# Patient Record
Sex: MALE | Race: WHITE | NOT HISPANIC OR LATINO | Employment: UNEMPLOYED | URBAN - METROPOLITAN AREA
[De-identification: names, ages, dates, MRNs, and addresses within clinical notes are randomized per-mention and may not be internally consistent; named-entity substitution may affect disease eponyms.]

---

## 2018-06-08 ENCOUNTER — OFFICE VISIT (OUTPATIENT)
Dept: FAMILY MEDICINE CLINIC | Facility: CLINIC | Age: 28
End: 2018-06-08

## 2018-06-08 VITALS
HEART RATE: 92 BPM | TEMPERATURE: 98.6 F | HEIGHT: 69 IN | DIASTOLIC BLOOD PRESSURE: 80 MMHG | WEIGHT: 139 LBS | BODY MASS INDEX: 20.59 KG/M2 | RESPIRATION RATE: 16 BRPM | SYSTOLIC BLOOD PRESSURE: 110 MMHG

## 2018-06-08 DIAGNOSIS — F41.1 GENERALIZED ANXIETY DISORDER: Primary | ICD-10-CM

## 2018-06-08 PROCEDURE — 99213 OFFICE O/P EST LOW 20 MIN: CPT | Performed by: FAMILY MEDICINE

## 2018-06-08 RX ORDER — ESCITALOPRAM OXALATE 10 MG/1
10 TABLET ORAL DAILY
Qty: 30 TABLET | Refills: 2 | Status: SHIPPED | OUTPATIENT
Start: 2018-06-08 | End: 2018-10-05

## 2018-06-08 NOTE — PROGRESS NOTES
Subjective   Hebert Pedraza is a 27 y.o. male.     History of Present Illness     His mood has been an issue for a long time  He has had issues with drugs and has had issues with relationships  Going through rehab for meth addiction at this time  He has had infrequent use of meth recently. Last usage was couple days ago  Got out of alf one month ago and had been there for 3 months    His anxiety is a major issue  He has not done counseling since he was younger  Without meth he is more depressed and down    In 2015 he tried lexapro but did not stay on it very long    The following portions of the patient's history were reviewed and updated as appropriate: allergies, current medications, past family history, past medical history, past social history, past surgical history and problem list.    Review of Systems   Constitutional: Negative.    Psychiatric/Behavioral: Positive for dysphoric mood. The patient is nervous/anxious.        Objective   Physical Exam   Constitutional: He appears well-developed and well-nourished. No distress.   Cardiovascular: Normal rate, regular rhythm and normal heart sounds.    Pulmonary/Chest: Effort normal and breath sounds normal.   Psychiatric: He has a normal mood and affect. His behavior is normal.   Nursing note and vitals reviewed.      Assessment/Plan   Hebert was seen today for anxiety.    Diagnoses and all orders for this visit:    Generalized anxiety disorder  -     escitalopram (LEXAPRO) 10 MG tablet; Take 1 tablet by mouth Daily.    discussed with pt the we can give medicine, he can do counseling but he also needs to follow up for success to be achieved.  Names for counselors given for pt.  Will start lexapro and recheck in one month.  counseling strongly encouraged  Meth use is an issue.  He is trying to wean off but uses every 2-3 days.

## 2018-06-12 ENCOUNTER — TELEPHONE (OUTPATIENT)
Dept: FAMILY MEDICINE CLINIC | Facility: CLINIC | Age: 28
End: 2018-06-12

## 2018-06-12 RX ORDER — HYDROXYZINE PAMOATE 25 MG/1
25 CAPSULE ORAL 3 TIMES DAILY PRN
Qty: 30 CAPSULE | Refills: 1 | Status: SHIPPED | OUTPATIENT
Start: 2018-06-12 | End: 2018-08-13 | Stop reason: SDUPTHER

## 2018-06-12 NOTE — TELEPHONE ENCOUNTER
----- Message from Yuri Iverson sent at 6/12/2018 10:21 AM EDT -----  Contact: PT   PT CALLED TO ASK THAT DR MANZO PLEASE CALL IN A RX FOR VISTARIL THE LOWEST DOSAGE. STATES THAT THE LEXAPRO IS AFFECTING HIS ANXIETY AND MAKES HIS HEART POUND THROUGH CHEST     USES [facility] IN [place]; CALL IF YOU CAN DO THAT

## 2018-08-13 RX ORDER — HYDROXYZINE PAMOATE 25 MG/1
CAPSULE ORAL
Qty: 30 CAPSULE | Refills: 1 | Status: SHIPPED | OUTPATIENT
Start: 2018-08-13 | End: 2018-10-05

## 2018-10-05 ENCOUNTER — OFFICE VISIT (OUTPATIENT)
Dept: FAMILY MEDICINE CLINIC | Facility: CLINIC | Age: 28
End: 2018-10-05

## 2018-10-05 VITALS
WEIGHT: 146 LBS | SYSTOLIC BLOOD PRESSURE: 118 MMHG | RESPIRATION RATE: 18 BRPM | TEMPERATURE: 97.6 F | DIASTOLIC BLOOD PRESSURE: 82 MMHG | HEART RATE: 84 BPM | HEIGHT: 69 IN | BODY MASS INDEX: 21.62 KG/M2

## 2018-10-05 DIAGNOSIS — R30.0 DYSURIA: ICD-10-CM

## 2018-10-05 DIAGNOSIS — Z72.0 TOBACCO ABUSE: ICD-10-CM

## 2018-10-05 DIAGNOSIS — J06.9 ACUTE URI: ICD-10-CM

## 2018-10-05 DIAGNOSIS — F15.10 METHAMPHETAMINE ABUSE (HCC): Primary | ICD-10-CM

## 2018-10-05 DIAGNOSIS — T78.40XA ALLERGIC REACTION, INITIAL ENCOUNTER: ICD-10-CM

## 2018-10-05 DIAGNOSIS — F51.01 PRIMARY INSOMNIA: ICD-10-CM

## 2018-10-05 PROCEDURE — 99214 OFFICE O/P EST MOD 30 MIN: CPT | Performed by: FAMILY MEDICINE

## 2018-10-05 PROCEDURE — 99406 BEHAV CHNG SMOKING 3-10 MIN: CPT | Performed by: FAMILY MEDICINE

## 2018-10-05 RX ORDER — NICOTINE 21 MG/24HR
1 PATCH, TRANSDERMAL 24 HOURS TRANSDERMAL EVERY 24 HOURS
Qty: 14 PATCH | Refills: 1 | Status: SHIPPED | OUTPATIENT
Start: 2018-10-05 | End: 2019-04-27 | Stop reason: SDUPTHER

## 2018-10-05 RX ORDER — LORATADINE 10 MG/1
CAPSULE, LIQUID FILLED ORAL
COMMUNITY
End: 2019-01-08 | Stop reason: SDUPTHER

## 2018-10-05 RX ORDER — TRAZODONE HYDROCHLORIDE 100 MG/1
100 TABLET ORAL NIGHTLY
Qty: 30 TABLET | Refills: 2 | Status: SHIPPED | OUTPATIENT
Start: 2018-10-05 | End: 2019-04-27 | Stop reason: SDUPTHER

## 2018-10-05 RX ORDER — OLANZAPINE 10 MG/1
10 TABLET ORAL NIGHTLY
COMMUNITY
End: 2019-02-26 | Stop reason: SDUPTHER

## 2018-10-05 RX ORDER — PHENOL 1.4 %
AEROSOL, SPRAY (ML) MUCOUS MEMBRANE
COMMUNITY
End: 2018-10-05

## 2018-10-05 RX ORDER — FLUOXETINE 10 MG/1
10 CAPSULE ORAL DAILY
COMMUNITY
End: 2018-11-21 | Stop reason: SDUPTHER

## 2018-10-05 NOTE — PROGRESS NOTES
Deng Ambrosio ROLAND Pedraza is a 27 y.o. male.     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 care as outpatient  Was sent home on 10/1/18    He complains of intermittent dysuria for the last 3 years  He denies urgency nor frequency  No penile disharge    He is worried about his allergies to iodine and alcohol swan  He needs to know this for donating plasma    He complains of cough and congestion for the last year  Worse with smoking meth  He has been feeling more ill the last week or so  He also smokes cigarettes    He is interested in smoking cessation  He was in [facility] until 10/1/18  He was on a patch there and it seemed to help  Smokes about 1/2 PPD and he  Has been smoking for 10+ years    He is concerned about sleep  He has not been sleeping well for quite a well  melatonin did not help    The following portions of the patient's history were reviewed and updated as appropriate: allergies, current medications, past family history, past medical history, past social history, past surgical history and problem list.    Review of Systems   Constitutional: Negative.    HENT: Positive for congestion.    Eyes: Negative.    Respiratory: Positive for cough. Negative for shortness of breath.    Cardiovascular: Negative.  Negative for chest pain.   Gastrointestinal: Negative.  Negative for constipation and diarrhea.   Genitourinary: Positive for dysuria.   Musculoskeletal: Negative.    Skin: Negative.    Neurological: Negative.    Psychiatric/Behavioral: Positive for agitation and behavioral problems.   All other systems reviewed and are negative.      Objective   Physical Exam   Constitutional: He is oriented to person, place, and time. He appears well-developed and well-nourished. No distress.   HENT:   Head: Normocephalic and atraumatic.   Right Ear: Hearing, tympanic membrane, external ear and ear canal normal.   Left Ear:  Hearing, tympanic membrane, external ear and ear canal normal.   Nose: Nose normal.   Mouth/Throat: Uvula is midline, oropharynx is clear and moist and mucous membranes are normal.   Eyes: Conjunctivae and EOM are normal.   Neck: Normal range of motion.   Cardiovascular: Normal rate, regular rhythm and normal heart sounds.    Pulmonary/Chest: Effort normal and breath sounds normal.   Abdominal: Soft. Bowel sounds are normal. He exhibits no distension. There is no tenderness.   Lymphadenopathy:     He has no cervical adenopathy.   Neurological: He is alert and oriented to person, place, and time.   Psychiatric: His behavior is normal. Judgment normal. His mood appears anxious. His speech is rapid and/or pressured.   He does have flight of ideas and jumps from topic to topic   Nursing note and vitals reviewed.      Assessment/Plan   Hebert was seen today for uri.    Diagnoses and all orders for this visit:    Methamphetamine abuse (CMS/[facility])    Dysuria  -     Urine Culture - , Urine, Clean Catch  -     Chlamydia trachomatis, Neisseria gonorrhoeae, Trichomonas vaginalis, PCR - , Urine, Clean Catch  -     POC Urinalysis Dipstick, Automated    Allergic reaction, initial encounter  -     Ambulatory Referral to Allergy    Tobacco abuse  -     nicotine (NICODERM CQ) 14 MG/24HR patch; Place 1 patch on the skin as directed by provider Daily.  -     nicotine (NICODERM CQ) 7 MG/24HR patch; Place 1 patch on the skin as directed by provider Daily.    Primary insomnia  -     traZODone (DESYREL) 100 MG tablet; Take 1 tablet by mouth Every Night.    Acute URI    recent stay at hospital for meth abuse.  He has been clean since coming home  Will check urine for infection or other cause of dysuria.  F/u pending results  Ok nicotine patch for smoking, more than 5 minutes spent discussing pros/cons/bendefits of medication options.  Health benefits of smoking cessation also reviewed.  Will try trazodone for sleep, if this persists he will  need to talk with his psychiatrist about other medical options.  Reassurance about URI, no medicine needed.

## 2018-11-19 RX ORDER — HYDROXYZINE PAMOATE 25 MG/1
CAPSULE ORAL
Qty: 30 CAPSULE | Refills: 1 | OUTPATIENT
Start: 2018-11-19

## 2018-11-21 ENCOUNTER — TELEPHONE (OUTPATIENT)
Dept: FAMILY MEDICINE CLINIC | Facility: CLINIC | Age: 28
End: 2018-11-21

## 2018-11-21 RX ORDER — FLUOXETINE 10 MG/1
10 CAPSULE ORAL DAILY
Qty: 30 CAPSULE | Refills: 2 | Status: SHIPPED | OUTPATIENT
Start: 2018-11-21 | End: 2019-02-18 | Stop reason: SDUPTHER

## 2018-11-21 NOTE — TELEPHONE ENCOUNTER
----- Message from Cande Montoya, Jacksoned Rep sent at 11/21/2018  3:47 PM EST -----  Contact: pt; teetee  Needs refill on [facility]    Pharmacy [facility] [address] [place]    [phone]

## 2019-01-07 RX ORDER — HYDROXYZINE PAMOATE 25 MG/1
CAPSULE ORAL
Qty: 30 CAPSULE | Refills: 1 | Status: SHIPPED | OUTPATIENT
Start: 2019-01-07 | End: 2019-01-08

## 2019-01-08 ENCOUNTER — TELEPHONE (OUTPATIENT)
Dept: FAMILY MEDICINE CLINIC | Facility: CLINIC | Age: 29
End: 2019-01-08

## 2019-01-08 RX ORDER — HYDROXYZINE PAMOATE 25 MG/1
CAPSULE ORAL
Qty: 30 CAPSULE | Refills: 1 | Status: SHIPPED | OUTPATIENT
Start: 2019-01-08 | End: 2020-02-03

## 2019-01-08 RX ORDER — LORATADINE 10 MG/1
CAPSULE, LIQUID FILLED ORAL
Qty: 30 EACH | Refills: 11 | Status: SHIPPED | OUTPATIENT
Start: 2019-01-08 | End: 2020-10-23

## 2019-01-08 NOTE — TELEPHONE ENCOUNTER
----- Message from Sheila Sawyer sent at 1/8/2019  9:51 AM EST -----  Contact: ANAIS; MED REFILL [facility]   Pharmacy called to get refills on the following mediation     Loratadine (CLARITIN) 10 MG capsule Take by mouth.   Melatonin 10mg  Taken by mouth at 6pm    [address] IN [place]

## 2019-02-18 ENCOUNTER — TELEPHONE (OUTPATIENT)
Dept: FAMILY MEDICINE CLINIC | Facility: CLINIC | Age: 29
End: 2019-02-18

## 2019-02-18 RX ORDER — FLUOXETINE HYDROCHLORIDE 20 MG/1
20 CAPSULE ORAL DAILY
Qty: 30 CAPSULE | Refills: 2 | Status: SHIPPED | OUTPATIENT
Start: 2019-02-18 | End: 2019-11-20 | Stop reason: SDUPTHER

## 2019-02-26 ENCOUNTER — TELEPHONE (OUTPATIENT)
Dept: FAMILY MEDICINE CLINIC | Facility: CLINIC | Age: 29
End: 2019-02-26

## 2019-02-26 RX ORDER — OLANZAPINE 10 MG/1
10 TABLET ORAL NIGHTLY
Qty: 30 TABLET | Refills: 1 | Status: SHIPPED | OUTPATIENT
Start: 2019-02-26 | End: 2019-11-20 | Stop reason: SDUPTHER

## 2019-02-26 NOTE — TELEPHONE ENCOUNTER
----- Message from Cande Montoya, RegMisheled Rep sent at 2/26/2019  2:51 PM EST -----  Contact: PT; ANAIS  REFILL      OLANZAPINE 10 OR 20MG BUT HE THINKS IT IS 20 MG    [facility] IN [facility]    PT: [phone]

## 2019-04-27 DIAGNOSIS — Z72.0 TOBACCO ABUSE: ICD-10-CM

## 2019-04-27 DIAGNOSIS — F51.01 PRIMARY INSOMNIA: ICD-10-CM

## 2019-05-13 RX ORDER — NICOTINE 14 MG/24H
PATCH, EXTENDED RELEASE TRANSDERMAL
Qty: 14 PATCH | Refills: 1 | Status: SHIPPED | OUTPATIENT
Start: 2019-05-13 | End: 2020-02-03

## 2019-05-13 RX ORDER — TRAZODONE HYDROCHLORIDE 100 MG/1
TABLET ORAL
Qty: 30 TABLET | Refills: 2 | Status: SHIPPED | OUTPATIENT
Start: 2019-05-13 | End: 2019-11-20 | Stop reason: SDUPTHER

## 2019-06-19 RX ORDER — OLANZAPINE 10 MG/1
TABLET ORAL
Qty: 30 TABLET | Refills: 1 | OUTPATIENT
Start: 2019-06-19

## 2019-06-24 RX ORDER — OLANZAPINE 10 MG/1
TABLET ORAL
Qty: 30 TABLET | Refills: 1 | OUTPATIENT
Start: 2019-06-24

## 2019-11-20 ENCOUNTER — OFFICE VISIT (OUTPATIENT)
Dept: FAMILY MEDICINE CLINIC | Facility: CLINIC | Age: 29
End: 2019-11-20

## 2019-11-20 VITALS
HEIGHT: 70 IN | TEMPERATURE: 97.8 F | RESPIRATION RATE: 16 BRPM | BODY MASS INDEX: 23.34 KG/M2 | SYSTOLIC BLOOD PRESSURE: 118 MMHG | DIASTOLIC BLOOD PRESSURE: 78 MMHG | WEIGHT: 163 LBS | HEART RATE: 76 BPM

## 2019-11-20 DIAGNOSIS — F41.1 GENERALIZED ANXIETY DISORDER: Primary | ICD-10-CM

## 2019-11-20 DIAGNOSIS — F51.01 PRIMARY INSOMNIA: ICD-10-CM

## 2019-11-20 DIAGNOSIS — Z72.0 TOBACCO ABUSE: ICD-10-CM

## 2019-11-20 PROCEDURE — 99213 OFFICE O/P EST LOW 20 MIN: CPT | Performed by: FAMILY MEDICINE

## 2019-11-20 RX ORDER — TRAZODONE HYDROCHLORIDE 100 MG/1
100 TABLET ORAL
Qty: 30 TABLET | Refills: 5 | Status: SHIPPED | OUTPATIENT
Start: 2019-11-20 | End: 2020-10-23

## 2019-11-20 RX ORDER — FLUOXETINE HYDROCHLORIDE 20 MG/1
20 CAPSULE ORAL DAILY
Qty: 30 CAPSULE | Refills: 5 | Status: SHIPPED | OUTPATIENT
Start: 2019-11-20 | End: 2020-10-23

## 2019-11-20 RX ORDER — OLANZAPINE 10 MG/1
10 TABLET ORAL NIGHTLY
Qty: 30 TABLET | Refills: 5 | Status: SHIPPED | OUTPATIENT
Start: 2019-11-20 | End: 2020-02-03

## 2019-11-20 RX ORDER — POLYETHYLENE GLYCOL 3350 17 G
POWDER IN PACKET (EA) ORAL
Qty: 408 EACH | Refills: 3 | Status: SHIPPED | OUTPATIENT
Start: 2019-11-20 | End: 2020-02-03

## 2019-11-20 NOTE — PROGRESS NOTES
Deng   Hebert Pedraza is a 28 y.o. male.     History of Present Illness     Mood had been doing well on zyprexa but he has been out for about 3 months  Without the medicine his mood was worse  More irritable, more agitated, felt more down and just worse all around without the zyprexa        Review of Systems   Constitutional: Negative.    Psychiatric/Behavioral: Negative.        Objective   Physical Exam   Constitutional: He appears well-developed and well-nourished.   HENT:   Head: Normocephalic and atraumatic.   Right Ear: Hearing, tympanic membrane, external ear and ear canal normal.   Left Ear: Hearing, tympanic membrane, external ear and ear canal normal.   Nose: Nose normal.   Mouth/Throat: Uvula is midline, oropharynx is clear and moist and mucous membranes are normal.   Eyes: Conjunctivae and EOM are normal.   Neck: Normal range of motion.   Cardiovascular: Normal rate, regular rhythm and normal heart sounds.   Pulmonary/Chest: Effort normal and breath sounds normal.   Lymphadenopathy:     He has no cervical adenopathy.   Psychiatric: He has a normal mood and affect. His behavior is normal.   Nursing note and vitals reviewed.      Assessment/Plan   Diagnoses and all orders for this visit:    Generalized anxiety disorder  -     OLANZapine (zyPREXA) 10 MG tablet; Take 1 tablet by mouth Every Night.  -     FLUoxetine (PROzac) 20 MG capsule; Take 1 capsule by mouth Daily.    Primary insomnia  -     traZODone (DESYREL) 100 MG tablet; Take 1 tablet by mouth every night at bedtime.    Tobacco abuse  -     nicotine polacrilex (COMMIT) 4 MG lozenge; 1 PO Q 4 hours PRN    will resume all meds for his mood as well as sleep.  He had been stable on regimen  Ok nicotine replacement, will try lozengers instead of gum, he will call with any issues

## 2020-01-20 ENCOUNTER — TELEPHONE (OUTPATIENT)
Dept: FAMILY MEDICINE CLINIC | Facility: CLINIC | Age: 30
End: 2020-01-20

## 2020-01-21 NOTE — TELEPHONE ENCOUNTER
ID called, they were advised we do not carry bcillin in the office to treat this. They will check with the health dept

## 2020-02-03 ENCOUNTER — NURSE TRIAGE (OUTPATIENT)
Dept: CALL CENTER | Facility: HOSPITAL | Age: 30
End: 2020-02-03

## 2020-02-03 ENCOUNTER — OFFICE VISIT (OUTPATIENT)
Dept: FAMILY MEDICINE CLINIC | Facility: CLINIC | Age: 30
End: 2020-02-03

## 2020-02-03 VITALS
WEIGHT: 183 LBS | HEART RATE: 88 BPM | BODY MASS INDEX: 26.2 KG/M2 | TEMPERATURE: 97.2 F | HEIGHT: 70 IN | DIASTOLIC BLOOD PRESSURE: 82 MMHG | SYSTOLIC BLOOD PRESSURE: 118 MMHG | RESPIRATION RATE: 16 BRPM

## 2020-02-03 DIAGNOSIS — F41.1 GENERALIZED ANXIETY DISORDER: Primary | ICD-10-CM

## 2020-02-03 DIAGNOSIS — F33.2 SEVERE EPISODE OF RECURRENT MAJOR DEPRESSIVE DISORDER, WITHOUT PSYCHOTIC FEATURES (HCC): ICD-10-CM

## 2020-02-03 DIAGNOSIS — F15.21: ICD-10-CM

## 2020-02-03 PROCEDURE — 99214 OFFICE O/P EST MOD 30 MIN: CPT | Performed by: FAMILY MEDICINE

## 2020-02-03 RX ORDER — EMTRICITABINE AND TENOFOVIR DISOPROXIL FUMARATE 200; 300 MG/1; MG/1
1 TABLET, FILM COATED ORAL DAILY
COMMUNITY
Start: 2019-11-20 | End: 2020-10-23

## 2020-02-03 NOTE — TELEPHONE ENCOUNTER
"Suggested to go to the ED, patient is employed, wanting an office appointment, the Hub feels they can get him in to see his provider ,  Patient admits he is not suicidal. Patient assured the triage nurse if unable to see provider will go to the ED     Reason for Disposition  • Very strange or confused behavior    Additional Information  • Negative: Patient attempted suicide  • Negative: Patient is threatening suicide now  • Negative: Violent behavior, or threatening to physically hurt or kill someone  • Negative: [1] Patient is very confused (disoriented, slurred speech) AND [2] no other adult (e.g., friend or family member) available  • Negative: [1] Difficult to awaken or acting very confused (disoriented, slurred speech) AND [2] new onset  • Negative: Sounds like a life-threatening emergency to the triager  • Negative: Alcohol use, abuse or dependence, question or problem related to  • Negative: Drug abuse or dependence, question or problem related to  • Negative: Bipolar disorder (manic depression)  • Negative: Depression during the postpartum period (< 1 year since delivery)  • Negative: [1] Depression AND [2] unable to do any of normal activities (e.g., self care, school, work; in comparison to baseline).    Answer Assessment - Initial Assessment Questions  1. CONCERN: \"What happened that made you call today?\"      Thinks he needs medication, has feelings of worthless  2. DEPRESSION SYMPTOM SCREENING: \"How are you feeling overall?\" (e.g., decreased energy, increased sleeping or difficulty sleeping, difficulty concentrating, feelings of sadness, guilt, hopelessness, or worthlessness)      depressed  3. RISK OF HARM - SUICIDAL IDEATION:  \"Do you ever have thoughts of hurting or killing yourself?\"  (e.g., yes, no, no but preoccupation with thoughts about death)    - INTENT:  \"Do you have thoughts of hurting or killing yourself right NOW?\" (e.g., yes, no, N/A)    - PLAN: \"Do you have a specific plan for how you " "would do this?\" (e.g., gun, knife, overdose, no plan, N/A)      no  4. RISK OF HARM - HOMICIDAL IDEATION:  \"Do you ever have thoughts of hurting or killing someone else?\"  (e.g., yes, no, no but preoccupation with thoughts about death)    - INTENT:  \"Do you have thoughts of hurting or killing someone right NOW?\" (e.g., yes, no, N/A)    - PLAN: \"Do you have a specific plan for how you would do this?\" (e.g., gun, knife, no plan, N/A)       no  5. FUNCTIONAL IMPAIRMENT: \"How have things been going for you overall in your life? Have you had any more difficulties than usual doing your normal daily activities?\"  (e.g., better, same, worse; self-care, school, work, interactions)      no  6. SUPPORT: \"Who is with you now?\" \"Who do you live with?\" \"Do you have family or friends nearby who you can talk to?\"       Has a mother  7. THERAPIST: \"Do you have a counselor or therapist? Name?\"      no  8. STRESSORS: \"Has there been any new stress or recent changes in your life?\"      Feelings of worthless  9. DRUG ABUSE/ALCOHOL: \"Do you drink alcohol or use any illegal drugs?\"       Has used Meth but is clean for 4 months  10. OTHER: \"Do you have any other health or medical symptoms right now?\" (e.g., fever)        no  11. PREGNANCY: \"Is there any chance you are pregnant?\" \"When was your last menstrual period?\"        na    Protocols used: DEPRESSION-ADULT-AH      "

## 2020-02-03 NOTE — PROGRESS NOTES
Deng Pedraza is a 29 y.o. male.     History of Present Illness     Pt being seen today on emergency status as walk in due to a call he made to [facility] about his poor mood and thoughts that he would be better off if he was not alive.  He has been clean for the last 4 1/2 months after long history of meth abuse and he has a complicated issue with anxiety.  Has an issue with compliance and follow up as well    He is just always anxious and depressed and this has been persistent.  Has no desire to get things done  Feels like he does not want to be here but knows he does not want to die    He has no plan to hurt himself but just thinks that he would rather be asleep or not here.  Having these thoughts but does not want to die does not want to hurt himself  He is only on fluoxetine as zyprexa caused RLS and so he stopped it  These medications did help at first but are no longer helping  He is not seeing his psych nor counselor at this time, has been a while since he saw them.    Living sober living house at this time    The following portions of the patient's history were reviewed and updated as appropriate: allergies, current medications, past family history, past medical history, past social history, past surgical history and problem list.    Review of Systems   Constitutional: Negative.    HENT: Negative.    Eyes: Negative.    Respiratory: Negative.  Negative for shortness of breath.    Cardiovascular: Negative.    Gastrointestinal: Negative.    Musculoskeletal: Negative.    Skin: Negative.    Neurological: Negative.    Psychiatric/Behavioral: Positive for dysphoric mood. Negative for self-injury. The patient is nervous/anxious.    All other systems reviewed and are negative.      Objective   Physical Exam   Constitutional: He appears well-developed and well-nourished. No distress.   Cardiovascular: Normal rate, regular rhythm and normal heart sounds.   Pulmonary/Chest: Effort normal and breath sounds normal.    Psychiatric: He has a normal mood and affect. His behavior is normal. Judgment and thought content normal.   Nursing note and vitals reviewed.      Assessment/Plan   Hebert was seen today for depression and anxiety.    Diagnoses and all orders for this visit:    Generalized anxiety disorder    Severe episode of recurrent major depressive disorder, without psychotic features (CMS/HCC)    Psychostimulant dependence in remission (CMS/HCC)    after discussion with pt I do not feel he is a threat to himself and he contracts for safety.  He will notify us or some one else if thoughts of hurting himself worsen or he develops a plan to hurt himself.  Counseling strongly encouraged and names for counselors given.  Will continue prozac and add vraylar 1.5 mg daily to regimemn.  Plan to recheck in one month.

## 2020-09-23 ENCOUNTER — TELEPHONE (OUTPATIENT)
Dept: FAMILY MEDICINE CLINIC | Facility: CLINIC | Age: 30
End: 2020-09-23

## 2020-09-24 RX ORDER — OLANZAPINE 10 MG/1
TABLET ORAL
Qty: 14 TABLET | Refills: 0 | Status: SHIPPED | OUTPATIENT
Start: 2020-09-24 | End: 2020-10-23 | Stop reason: SDUPTHER

## 2020-09-24 NOTE — TELEPHONE ENCOUNTER
Kirti, please call pt and schedule appt w/ Dr Milton. He's aware to come fasting.     (It's after 5pm)

## 2020-10-23 ENCOUNTER — OFFICE VISIT (OUTPATIENT)
Dept: FAMILY MEDICINE CLINIC | Facility: CLINIC | Age: 30
End: 2020-10-23

## 2020-10-23 VITALS
DIASTOLIC BLOOD PRESSURE: 82 MMHG | HEIGHT: 70 IN | BODY MASS INDEX: 23.77 KG/M2 | HEART RATE: 76 BPM | TEMPERATURE: 97.3 F | WEIGHT: 166 LBS | SYSTOLIC BLOOD PRESSURE: 128 MMHG | RESPIRATION RATE: 18 BRPM

## 2020-10-23 DIAGNOSIS — F15.21: ICD-10-CM

## 2020-10-23 DIAGNOSIS — F41.1 GENERALIZED ANXIETY DISORDER: Primary | ICD-10-CM

## 2020-10-23 DIAGNOSIS — F31.9 BIPOLAR 1 DISORDER (HCC): ICD-10-CM

## 2020-10-23 PROCEDURE — 99213 OFFICE O/P EST LOW 20 MIN: CPT | Performed by: FAMILY MEDICINE

## 2020-10-23 RX ORDER — OLANZAPINE 10 MG/1
10 TABLET ORAL
Qty: 30 TABLET | Refills: 2 | Status: SHIPPED | OUTPATIENT
Start: 2020-10-23 | End: 2020-11-02 | Stop reason: SDUPTHER

## 2020-10-23 NOTE — PROGRESS NOTES
Deng   eHbert Pedraza is a 29 y.o. male.     History of Present Illness     Pt has a complicated mood issue and continues to struggle with his mood  He has had issues with not showing up for appointments and not taking medicine as prescribed  He tried the vraylar a few days after his last Feb appointment but did not take it long enough to see if this helped  He is not currently on any medicine    He is concerned about focus and his mom is as well  He has had an issue with meth and other drug use    He was admitted to [facility] about 6 months ago and they placed him on zyprexa 10  The last time he was taking this medicine was about 2 months ago      Review of Systems   Constitutional: Negative.    Psychiatric/Behavioral:        Hpi       Objective   Physical Exam  Vitals signs and nursing note reviewed.   Constitutional:       General: He is not in acute distress.     Appearance: Normal appearance. He is well-developed.   Cardiovascular:      Rate and Rhythm: Normal rate and regular rhythm.      Heart sounds: Normal heart sounds.   Pulmonary:      Effort: Pulmonary effort is normal.      Breath sounds: Normal breath sounds.   Neurological:      Mental Status: He is alert and oriented to person, place, and time.   Psychiatric:         Mood and Affect: Mood normal.         Behavior: Behavior normal.         Thought Content: Thought content normal.         Judgment: Judgment normal.         Assessment/Plan   Diagnoses and all orders for this visit:    1. Generalized anxiety disorder (Primary)  -     OLANZapine (zyPREXA) 10 MG tablet; Take 1 tablet by mouth every night at bedtime.  Dispense: 30 tablet; Refill: 2    2. Bipolar 1 disorder (CMS/HCC)  -     OLANZapine (zyPREXA) 10 MG tablet; Take 1 tablet by mouth every night at bedtime.  Dispense: 30 tablet; Refill: 2    3. Psychostimulant dependence in remission (CMS/HCC)    at this point, he simply needs a psychiatrist.  He has had issues with compliance with  medicine,m illicit drug use, and keeping scheduled appointments with me.  I discussed these impediments to good care and he will try to be compliant with psych.  Comp care recommended due to insurance but multiple names given.  Ok for zyprexa refill.  He simply did not take the medicines I gave him in Feb and was not consistent with follow ups.  He and mom (through letter) ask about medicine for ADHD, but he will need to get this from mental health expert.

## 2020-11-02 ENCOUNTER — TELEPHONE (OUTPATIENT)
Dept: FAMILY MEDICINE CLINIC | Facility: CLINIC | Age: 30
End: 2020-11-02

## 2020-11-02 DIAGNOSIS — F31.9 BIPOLAR 1 DISORDER (HCC): ICD-10-CM

## 2020-11-02 DIAGNOSIS — F41.1 GENERALIZED ANXIETY DISORDER: ICD-10-CM

## 2020-11-02 RX ORDER — OLANZAPINE 10 MG/1
10 TABLET ORAL
Qty: 30 TABLET | Refills: 2 | OUTPATIENT
Start: 2020-11-02 | End: 2021-10-29

## 2020-11-02 NOTE — TELEPHONE ENCOUNTER
zyprexa 10 mg was sent in to [facility] on 10/23/2020.  I will send in again

## 2020-11-02 NOTE — TELEPHONE ENCOUNTER
PATIENT CALLED AND STATED THAT HE WAS IN LAST WEEK TO FOR APPOINTMENT AND HE UNDERSTOOD THAT A PRESCRIPTION WAS GOING TO BE SENT IN FOR HIS BIPOLAR 2.  HOWEVER, PHARMACY HAS NOT RECEIVED.       [id number] - [place], [place] - [address] [place] [address] AT [facility] - [phone] PH

## 2020-11-10 RX ORDER — BUPROPION HYDROCHLORIDE 150 MG/1
TABLET, EXTENDED RELEASE ORAL
Qty: 30 TABLET | Refills: 5 | OUTPATIENT
Start: 2020-11-10 | End: 2021-10-29

## 2020-11-10 RX ORDER — ARIPIPRAZOLE 5 MG/1
TABLET ORAL
Qty: 30 TABLET | Refills: 5 | OUTPATIENT
Start: 2020-11-10 | End: 2021-10-29

## 2020-12-30 RX ORDER — TRAZODONE HYDROCHLORIDE 100 MG/1
TABLET ORAL
Qty: 30 TABLET | Refills: 2 | OUTPATIENT
Start: 2020-12-30 | End: 2021-10-29

## 2021-10-29 ENCOUNTER — APPOINTMENT (OUTPATIENT)
Dept: GENERAL RADIOLOGY | Facility: HOSPITAL | Age: 31
End: 2021-10-29

## 2021-10-29 ENCOUNTER — HOSPITAL ENCOUNTER (EMERGENCY)
Facility: HOSPITAL | Age: 31
Discharge: HOME | End: 2021-10-29
Attending: EMERGENCY MEDICINE | Admitting: EMERGENCY MEDICINE

## 2021-10-29 VITALS
SYSTOLIC BLOOD PRESSURE: 116 MMHG | WEIGHT: 160 LBS | TEMPERATURE: 97.7 F | RESPIRATION RATE: 15 BRPM | HEIGHT: 69 IN | OXYGEN SATURATION: 100 % | HEART RATE: 64 BPM | DIASTOLIC BLOOD PRESSURE: 85 MMHG | BODY MASS INDEX: 23.7 KG/M2

## 2021-10-29 DIAGNOSIS — F31.9 BIPOLAR AFFECTIVE DISORDER, REMISSION STATUS UNSPECIFIED (HCC): ICD-10-CM
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
## 2021-10-29 DIAGNOSIS — S93.401A SPRAIN OF RIGHT ANKLE, UNSPECIFIED LIGAMENT, INITIAL ENCOUNTER: Primary | ICD-10-CM

## 2021-10-29 PROCEDURE — 99284 EMERGENCY DEPT VISIT MOD MDM: CPT

## 2021-10-29 PROCEDURE — 73610 X-RAY EXAM OF ANKLE: CPT

## 2021-10-29 RX ORDER — NAPROXEN 250 MG/1
500 TABLET ORAL ONCE
Status: COMPLETED | OUTPATIENT
Start: 2021-10-29 | End: 2021-10-29

## 2021-10-29 RX ORDER — ACETAMINOPHEN 500 MG
1000 TABLET ORAL EVERY 6 HOURS PRN
Qty: 30 TABLET | Refills: 0 | Status: ON HOLD | OUTPATIENT
Start: 2021-10-29 | End: 2023-02-04

## 2021-10-29 RX ORDER — ACETAMINOPHEN 500 MG
1000 TABLET ORAL ONCE
Status: COMPLETED | OUTPATIENT
Start: 2021-10-29 | End: 2021-10-29

## 2021-10-29 RX ORDER — IBUPROFEN 200 MG
400 TABLET ORAL EVERY 6 HOURS PRN
Qty: 20 TABLET | Refills: 0 | Status: ON HOLD | OUTPATIENT
Start: 2021-10-29 | End: 2023-02-04

## 2021-10-29 RX ADMIN — NAPROXEN 500 MG: 250 TABLET ORAL at 07:44

## 2021-10-29 RX ADMIN — ACETAMINOPHEN 1000 MG: 500 TABLET, FILM COATED ORAL at 07:44

## 2023-02-03 ENCOUNTER — HOSPITAL ENCOUNTER (INPATIENT)
Facility: HOSPITAL | Age: 33
LOS: 5 days | Discharge: HOME | DRG: 885 | End: 2023-02-08
Attending: STUDENT IN AN ORGANIZED HEALTH CARE EDUCATION/TRAINING PROGRAM | Admitting: STUDENT IN AN ORGANIZED HEALTH CARE EDUCATION/TRAINING PROGRAM
Payer: MEDICAID

## 2023-02-03 ENCOUNTER — HOSPITAL ENCOUNTER (EMERGENCY)
Facility: HOSPITAL | Age: 33
Discharge: PSYCHIATRIC OR SUBSTANCE USE FACILITY | DRG: 885 | End: 2023-02-03
Attending: STUDENT IN AN ORGANIZED HEALTH CARE EDUCATION/TRAINING PROGRAM | Admitting: STUDENT IN AN ORGANIZED HEALTH CARE EDUCATION/TRAINING PROGRAM
Payer: MEDICAID

## 2023-02-03 VITALS
SYSTOLIC BLOOD PRESSURE: 117 MMHG | HEIGHT: 69 IN | WEIGHT: 145 LBS | DIASTOLIC BLOOD PRESSURE: 78 MMHG | OXYGEN SATURATION: 100 % | TEMPERATURE: 97.2 F | RESPIRATION RATE: 18 BRPM | HEART RATE: 102 BPM | BODY MASS INDEX: 21.48 KG/M2

## 2023-02-03 DIAGNOSIS — F19.10 SUBSTANCE ABUSE: ICD-10-CM

## 2023-02-03 DIAGNOSIS — F29 PSYCHOSIS, UNSPECIFIED PSYCHOSIS TYPE: Primary | ICD-10-CM

## 2023-02-03 DIAGNOSIS — R76.8 HEPATITIS C ANTIBODY TEST POSITIVE: Primary | ICD-10-CM

## 2023-02-03 LAB
ALBUMIN SERPL-MCNC: 4.5 G/DL (ref 3.5–5.2)
ALBUMIN/GLOB SERPL: 1.5 G/DL
ALP SERPL-CCNC: 81 U/L (ref 39–117)
ALT SERPL W P-5'-P-CCNC: 33 U/L (ref 1–41)
AMPHET+METHAMPHET UR QL: POSITIVE
AMPHETAMINES UR QL: POSITIVE
ANION GAP SERPL CALCULATED.3IONS-SCNC: 18.7 MMOL/L (ref 5–15)
AST SERPL-CCNC: 44 U/L (ref 1–40)
BARBITURATES UR QL SCN: NEGATIVE
BASOPHILS # BLD AUTO: 0.02 10*3/MM3 (ref 0–0.2)
BASOPHILS NFR BLD AUTO: 0.2 % (ref 0–1.5)
BENZODIAZ UR QL SCN: NEGATIVE
BILIRUB SERPL-MCNC: 0.7 MG/DL (ref 0–1.2)
BILIRUB UR QL STRIP: NEGATIVE
BUN SERPL-MCNC: 18 MG/DL (ref 6–20)
BUN/CREAT SERPL: 14.5 (ref 7–25)
BUPRENORPHINE SERPL-MCNC: NEGATIVE NG/ML
CALCIUM SPEC-SCNC: 9.7 MG/DL (ref 8.6–10.5)
CANNABINOIDS SERPL QL: NEGATIVE
CHLORIDE SERPL-SCNC: 99 MMOL/L (ref 98–107)
CLARITY UR: CLEAR
CO2 SERPL-SCNC: 18.3 MMOL/L (ref 22–29)
COCAINE UR QL: NEGATIVE
COLOR UR: YELLOW
CREAT SERPL-MCNC: 1.24 MG/DL (ref 0.76–1.27)
DEPRECATED RDW RBC AUTO: 43.6 FL (ref 37–54)
EGFRCR SERPLBLD CKD-EPI 2021: 79.2 ML/MIN/1.73
EOSINOPHIL # BLD AUTO: 0.02 10*3/MM3 (ref 0–0.4)
EOSINOPHIL NFR BLD AUTO: 0.2 % (ref 0.3–6.2)
ERYTHROCYTE [DISTWIDTH] IN BLOOD BY AUTOMATED COUNT: 13.3 % (ref 12.3–15.4)
ETHANOL BLD-MCNC: <10 MG/DL (ref 0–10)
ETHANOL UR QL: <0.01 %
FLUAV RNA RESP QL NAA+PROBE: NOT DETECTED
FLUBV RNA ISLT QL NAA+PROBE: NOT DETECTED
GLOBULIN UR ELPH-MCNC: 3 GM/DL
GLUCOSE SERPL-MCNC: 145 MG/DL (ref 65–99)
GLUCOSE UR STRIP-MCNC: NEGATIVE MG/DL
HCT VFR BLD AUTO: 40.6 % (ref 37.5–51)
HGB BLD-MCNC: 13.4 G/DL (ref 13–17.7)
HGB UR QL STRIP.AUTO: NEGATIVE
HOLD SPECIMEN: NORMAL
HOLD SPECIMEN: NORMAL
IMM GRANULOCYTES # BLD AUTO: 0.02 10*3/MM3 (ref 0–0.05)
IMM GRANULOCYTES NFR BLD AUTO: 0.2 % (ref 0–0.5)
KETONES UR QL STRIP: ABNORMAL
LEUKOCYTE ESTERASE UR QL STRIP.AUTO: NEGATIVE
LYMPHOCYTES # BLD AUTO: 2.46 10*3/MM3 (ref 0.7–3.1)
LYMPHOCYTES NFR BLD AUTO: 30.6 % (ref 19.6–45.3)
MAGNESIUM SERPL-MCNC: 2.3 MG/DL (ref 1.6–2.6)
MCH RBC QN AUTO: 29.3 PG (ref 26.6–33)
MCHC RBC AUTO-ENTMCNC: 33 G/DL (ref 31.5–35.7)
MCV RBC AUTO: 88.8 FL (ref 79–97)
METHADONE UR QL SCN: NEGATIVE
MONOCYTES # BLD AUTO: 0.93 10*3/MM3 (ref 0.1–0.9)
MONOCYTES NFR BLD AUTO: 11.6 % (ref 5–12)
NEUTROPHILS NFR BLD AUTO: 4.59 10*3/MM3 (ref 1.7–7)
NEUTROPHILS NFR BLD AUTO: 57.2 % (ref 42.7–76)
NITRITE UR QL STRIP: NEGATIVE
NRBC BLD AUTO-RTO: 0 /100 WBC (ref 0–0.2)
OPIATES UR QL: NEGATIVE
OXYCODONE UR QL SCN: NEGATIVE
PCP UR QL SCN: NEGATIVE
PH UR STRIP.AUTO: 6.5 [PH] (ref 5–8)
PLATELET # BLD AUTO: 354 10*3/MM3 (ref 140–450)
PMV BLD AUTO: 9 FL (ref 6–12)
POTASSIUM SERPL-SCNC: 3.1 MMOL/L (ref 3.5–5.2)
PROPOXYPH UR QL: NEGATIVE
PROT SERPL-MCNC: 7.5 G/DL (ref 6–8.5)
PROT UR QL STRIP: NEGATIVE
RBC # BLD AUTO: 4.57 10*6/MM3 (ref 4.14–5.8)
SARS-COV-2 RNA PNL SPEC NAA+PROBE: NOT DETECTED
SODIUM SERPL-SCNC: 136 MMOL/L (ref 136–145)
SP GR UR STRIP: 1.02 (ref 1–1.03)
TRICYCLICS UR QL SCN: NEGATIVE
UROBILINOGEN UR QL STRIP: ABNORMAL
WBC NRBC COR # BLD: 8.04 10*3/MM3 (ref 3.4–10.8)
WHOLE BLOOD HOLD COAG: NORMAL
WHOLE BLOOD HOLD SPECIMEN: NORMAL

## 2023-02-03 PROCEDURE — 99285 EMERGENCY DEPT VISIT HI MDM: CPT

## 2023-02-03 PROCEDURE — 83735 ASSAY OF MAGNESIUM: CPT | Performed by: PHYSICIAN ASSISTANT

## 2023-02-03 PROCEDURE — 36415 COLL VENOUS BLD VENIPUNCTURE: CPT

## 2023-02-03 PROCEDURE — 80306 DRUG TEST PRSMV INSTRMNT: CPT | Performed by: PHYSICIAN ASSISTANT

## 2023-02-03 PROCEDURE — 82077 ASSAY SPEC XCP UR&BREATH IA: CPT | Performed by: PHYSICIAN ASSISTANT

## 2023-02-03 PROCEDURE — 81003 URINALYSIS AUTO W/O SCOPE: CPT | Performed by: PHYSICIAN ASSISTANT

## 2023-02-03 PROCEDURE — 25010000002 LORAZEPAM PER 2 MG: Performed by: STUDENT IN AN ORGANIZED HEALTH CARE EDUCATION/TRAINING PROGRAM

## 2023-02-03 PROCEDURE — 25010000002 DIPHENHYDRAMINE PER 50 MG: Performed by: STUDENT IN AN ORGANIZED HEALTH CARE EDUCATION/TRAINING PROGRAM

## 2023-02-03 PROCEDURE — 84145 PROCALCITONIN (PCT): CPT | Performed by: HOSPITALIST

## 2023-02-03 PROCEDURE — 87636 SARSCOV2 & INF A&B AMP PRB: CPT | Performed by: PHYSICIAN ASSISTANT

## 2023-02-03 PROCEDURE — 86140 C-REACTIVE PROTEIN: CPT | Performed by: HOSPITALIST

## 2023-02-03 PROCEDURE — 80053 COMPREHEN METABOLIC PANEL: CPT | Performed by: PHYSICIAN ASSISTANT

## 2023-02-03 PROCEDURE — 93005 ELECTROCARDIOGRAM TRACING: CPT | Performed by: STUDENT IN AN ORGANIZED HEALTH CARE EDUCATION/TRAINING PROGRAM

## 2023-02-03 PROCEDURE — 85025 COMPLETE CBC W/AUTO DIFF WBC: CPT | Performed by: PHYSICIAN ASSISTANT

## 2023-02-03 PROCEDURE — 25010000002 HALOPERIDOL LACTATE PER 5 MG: Performed by: STUDENT IN AN ORGANIZED HEALTH CARE EDUCATION/TRAINING PROGRAM

## 2023-02-03 RX ORDER — ECHINACEA PURPUREA EXTRACT 125 MG
2 TABLET ORAL AS NEEDED
Status: DISCONTINUED | OUTPATIENT
Start: 2023-02-03 | End: 2023-02-08 | Stop reason: HOSPADM

## 2023-02-03 RX ORDER — NICOTINE 21 MG/24HR
1 PATCH, TRANSDERMAL 24 HOURS TRANSDERMAL
Status: DISCONTINUED | OUTPATIENT
Start: 2023-02-04 | End: 2023-02-08 | Stop reason: HOSPADM

## 2023-02-03 RX ORDER — ALUMINA, MAGNESIA, AND SIMETHICONE 2400; 2400; 240 MG/30ML; MG/30ML; MG/30ML
15 SUSPENSION ORAL EVERY 6 HOURS PRN
Status: DISCONTINUED | OUTPATIENT
Start: 2023-02-03 | End: 2023-02-08 | Stop reason: HOSPADM

## 2023-02-03 RX ORDER — LORAZEPAM 2 MG/ML
2 INJECTION INTRAMUSCULAR EVERY 6 HOURS PRN
Status: DISCONTINUED | OUTPATIENT
Start: 2023-02-03 | End: 2023-02-08 | Stop reason: HOSPADM

## 2023-02-03 RX ORDER — TRAZODONE HYDROCHLORIDE 50 MG/1
50 TABLET ORAL NIGHTLY PRN
Status: DISCONTINUED | OUTPATIENT
Start: 2023-02-03 | End: 2023-02-04

## 2023-02-03 RX ORDER — LOPERAMIDE HYDROCHLORIDE 2 MG/1
2 CAPSULE ORAL
Status: DISCONTINUED | OUTPATIENT
Start: 2023-02-03 | End: 2023-02-08 | Stop reason: HOSPADM

## 2023-02-03 RX ORDER — RISPERIDONE 1 MG/1
1 TABLET ORAL ONCE
Status: COMPLETED | OUTPATIENT
Start: 2023-02-03 | End: 2023-02-03

## 2023-02-03 RX ORDER — DIPHENHYDRAMINE HYDROCHLORIDE 50 MG/ML
50 INJECTION INTRAMUSCULAR; INTRAVENOUS EVERY 6 HOURS PRN
Status: DISCONTINUED | OUTPATIENT
Start: 2023-02-03 | End: 2023-02-08 | Stop reason: HOSPADM

## 2023-02-03 RX ORDER — IBUPROFEN 400 MG/1
400 TABLET ORAL EVERY 6 HOURS PRN
Status: DISCONTINUED | OUTPATIENT
Start: 2023-02-03 | End: 2023-02-08 | Stop reason: HOSPADM

## 2023-02-03 RX ORDER — BENZONATATE 100 MG/1
100 CAPSULE ORAL 3 TIMES DAILY PRN
Status: DISCONTINUED | OUTPATIENT
Start: 2023-02-03 | End: 2023-02-08 | Stop reason: HOSPADM

## 2023-02-03 RX ORDER — ONDANSETRON 4 MG/1
4 TABLET, FILM COATED ORAL EVERY 6 HOURS PRN
Status: DISCONTINUED | OUTPATIENT
Start: 2023-02-03 | End: 2023-02-04

## 2023-02-03 RX ORDER — HALOPERIDOL 5 MG/ML
5 INJECTION INTRAMUSCULAR EVERY 6 HOURS PRN
Status: DISCONTINUED | OUTPATIENT
Start: 2023-02-03 | End: 2023-02-06

## 2023-02-03 RX ORDER — BENZTROPINE MESYLATE 1 MG/1
2 TABLET ORAL ONCE AS NEEDED
Status: DISCONTINUED | OUTPATIENT
Start: 2023-02-03 | End: 2023-02-08 | Stop reason: HOSPADM

## 2023-02-03 RX ORDER — POTASSIUM CHLORIDE 20 MEQ/1
40 TABLET, EXTENDED RELEASE ORAL ONCE
Status: COMPLETED | OUTPATIENT
Start: 2023-02-03 | End: 2023-02-03

## 2023-02-03 RX ORDER — FAMOTIDINE 20 MG/1
20 TABLET, FILM COATED ORAL 2 TIMES DAILY PRN
Status: DISCONTINUED | OUTPATIENT
Start: 2023-02-03 | End: 2023-02-08 | Stop reason: HOSPADM

## 2023-02-03 RX ORDER — HYDROXYZINE HYDROCHLORIDE 25 MG/1
50 TABLET, FILM COATED ORAL EVERY 6 HOURS PRN
Status: DISCONTINUED | OUTPATIENT
Start: 2023-02-03 | End: 2023-02-04

## 2023-02-03 RX ORDER — BENZTROPINE MESYLATE 1 MG/ML
1 INJECTION INTRAMUSCULAR; INTRAVENOUS ONCE AS NEEDED
Status: DISCONTINUED | OUTPATIENT
Start: 2023-02-03 | End: 2023-02-08 | Stop reason: HOSPADM

## 2023-02-03 RX ADMIN — LORAZEPAM 2 MG: 2 INJECTION INTRAMUSCULAR; INTRAVENOUS at 22:58

## 2023-02-03 RX ADMIN — HALOPERIDOL LACTATE 5 MG: 5 INJECTION, SOLUTION INTRAMUSCULAR at 22:58

## 2023-02-03 RX ADMIN — POTASSIUM CHLORIDE 40 MEQ: 1500 TABLET, EXTENDED RELEASE ORAL at 19:18

## 2023-02-03 RX ADMIN — DIPHENHYDRAMINE HYDROCHLORIDE 50 MG: 50 INJECTION INTRAMUSCULAR; INTRAVENOUS at 22:58

## 2023-02-03 RX ADMIN — RISPERIDONE 1 MG: 1 TABLET, FILM COATED ORAL at 18:44

## 2023-02-03 RX ADMIN — HYDROXYZINE HYDROCHLORIDE 50 MG: 25 TABLET ORAL at 20:41

## 2023-02-03 NOTE — NURSING NOTE
Spoke to doctor Renee intake information labs and V/S provided and discussed with provider, instructed to admit with routine  SP3, Benadryl 50mg IM, Ativan 2mg IM, Haldol 5mg IM PRN for agitation, and Risperdone 1mg ODT while in ER orders [id number]. Patient and ER provider made aware of admitting orders and plan of care.

## 2023-02-03 NOTE — NURSING NOTE
While waiting for intake restroom to open to search and change patient, the patient attempted to elope and leave intake area and was redirected back to intake area by security.

## 2023-02-03 NOTE — NURSING NOTE
Pt states he was at a [facility] chance program and they brought him here.     Pt states he's hyperactive, and states he's nervous, not sure if he's ready. States he's ran from the police before. States he hasn't been sleeping    Pt denies SI, HI, or AVH.    Pt appears to be responding to some internal stimuli.     I am unable to assess patient, he is looking around, mumbling to himself, unable to focus on questions on the assessment, and lag response during questions.

## 2023-02-04 LAB
ALBUMIN SERPL-MCNC: 3.8 G/DL (ref 3.5–5.2)
ALBUMIN/GLOB SERPL: 1.3 G/DL
ALP SERPL-CCNC: 75 U/L (ref 39–117)
ALT SERPL W P-5'-P-CCNC: 29 U/L (ref 1–41)
ANION GAP SERPL CALCULATED.3IONS-SCNC: 12.2 MMOL/L (ref 5–15)
ANION GAP SERPL CALCULATED.3IONS-SCNC: 12.4 MMOL/L (ref 5–15)
AST SERPL-CCNC: 49 U/L (ref 1–40)
BASOPHILS # BLD AUTO: 0.04 10*3/MM3 (ref 0–0.2)
BASOPHILS NFR BLD AUTO: 0.5 % (ref 0–1.5)
BILIRUB SERPL-MCNC: 0.5 MG/DL (ref 0–1.2)
BUN SERPL-MCNC: 20 MG/DL (ref 6–20)
BUN SERPL-MCNC: 22 MG/DL (ref 6–20)
BUN/CREAT SERPL: 16.1 (ref 7–25)
BUN/CREAT SERPL: 16.5 (ref 7–25)
CALCIUM SPEC-SCNC: 9.2 MG/DL (ref 8.6–10.5)
CALCIUM SPEC-SCNC: 9.2 MG/DL (ref 8.6–10.5)
CHLORIDE SERPL-SCNC: 106 MMOL/L (ref 98–107)
CHLORIDE SERPL-SCNC: 109 MMOL/L (ref 98–107)
CK SERPL-CCNC: 1041 U/L (ref 20–200)
CK SERPL-CCNC: 1592 U/L (ref 20–200)
CK SERPL-CCNC: 1620 U/L (ref 20–200)
CO2 SERPL-SCNC: 20.6 MMOL/L (ref 22–29)
CO2 SERPL-SCNC: 24.8 MMOL/L (ref 22–29)
CREAT SERPL-MCNC: 1.24 MG/DL (ref 0.76–1.27)
CREAT SERPL-MCNC: 1.33 MG/DL (ref 0.76–1.27)
CRP SERPL-MCNC: 0.47 MG/DL (ref 0–0.5)
D-LACTATE SERPL-SCNC: 0.8 MMOL/L (ref 0.5–2)
DEPRECATED RDW RBC AUTO: 45.3 FL (ref 37–54)
EGFRCR SERPLBLD CKD-EPI 2021: 72.8 ML/MIN/1.73
EGFRCR SERPLBLD CKD-EPI 2021: 79.2 ML/MIN/1.73
EOSINOPHIL # BLD AUTO: 0.06 10*3/MM3 (ref 0–0.4)
EOSINOPHIL NFR BLD AUTO: 0.7 % (ref 0.3–6.2)
ERYTHROCYTE [DISTWIDTH] IN BLOOD BY AUTOMATED COUNT: 13.3 % (ref 12.3–15.4)
GLOBULIN UR ELPH-MCNC: 3 GM/DL
GLUCOSE SERPL-MCNC: 93 MG/DL (ref 65–99)
GLUCOSE SERPL-MCNC: 96 MG/DL (ref 65–99)
HAV IGM SERPL QL IA: ABNORMAL
HBV CORE IGM SERPL QL IA: ABNORMAL
HBV SURFACE AG SERPL QL IA: ABNORMAL
HCT VFR BLD AUTO: 40.2 % (ref 37.5–51)
HCV AB SER DONR QL: REACTIVE
HGB BLD-MCNC: 13 G/DL (ref 13–17.7)
IMM GRANULOCYTES # BLD AUTO: 0.02 10*3/MM3 (ref 0–0.05)
IMM GRANULOCYTES NFR BLD AUTO: 0.2 % (ref 0–0.5)
LYMPHOCYTES # BLD AUTO: 2.21 10*3/MM3 (ref 0.7–3.1)
LYMPHOCYTES NFR BLD AUTO: 25.3 % (ref 19.6–45.3)
MCH RBC QN AUTO: 29.9 PG (ref 26.6–33)
MCHC RBC AUTO-ENTMCNC: 32.3 G/DL (ref 31.5–35.7)
MCV RBC AUTO: 92.4 FL (ref 79–97)
MONOCYTES # BLD AUTO: 1.06 10*3/MM3 (ref 0.1–0.9)
MONOCYTES NFR BLD AUTO: 12.1 % (ref 5–12)
NEUTROPHILS NFR BLD AUTO: 5.36 10*3/MM3 (ref 1.7–7)
NEUTROPHILS NFR BLD AUTO: 61.2 % (ref 42.7–76)
NRBC BLD AUTO-RTO: 0 /100 WBC (ref 0–0.2)
PLATELET # BLD AUTO: 292 10*3/MM3 (ref 140–450)
PMV BLD AUTO: 9 FL (ref 6–12)
POTASSIUM SERPL-SCNC: 3.6 MMOL/L (ref 3.5–5.2)
POTASSIUM SERPL-SCNC: 3.7 MMOL/L (ref 3.5–5.2)
PROCALCITONIN SERPL-MCNC: 0.16 NG/ML (ref 0–0.25)
PROT SERPL-MCNC: 6.8 G/DL (ref 6–8.5)
QT INTERVAL: 360 MS
QTC INTERVAL: 504 MS
RBC # BLD AUTO: 4.35 10*6/MM3 (ref 4.14–5.8)
SODIUM SERPL-SCNC: 139 MMOL/L (ref 136–145)
SODIUM SERPL-SCNC: 146 MMOL/L (ref 136–145)
WBC NRBC COR # BLD: 8.75 10*3/MM3 (ref 3.4–10.8)

## 2023-02-04 PROCEDURE — 85025 COMPLETE CBC W/AUTO DIFF WBC: CPT | Performed by: HOSPITALIST

## 2023-02-04 PROCEDURE — 82550 ASSAY OF CK (CPK): CPT | Performed by: HOSPITALIST

## 2023-02-04 PROCEDURE — 99223 1ST HOSP IP/OBS HIGH 75: CPT | Performed by: PSYCHIATRY & NEUROLOGY

## 2023-02-04 PROCEDURE — 80053 COMPREHEN METABOLIC PANEL: CPT | Performed by: STUDENT IN AN ORGANIZED HEALTH CARE EDUCATION/TRAINING PROGRAM

## 2023-02-04 PROCEDURE — 93010 ELECTROCARDIOGRAM REPORT: CPT | Performed by: INTERNAL MEDICINE

## 2023-02-04 PROCEDURE — 82550 ASSAY OF CK (CPK): CPT | Performed by: STUDENT IN AN ORGANIZED HEALTH CARE EDUCATION/TRAINING PROGRAM

## 2023-02-04 PROCEDURE — 25010000002 CHLORPROMAZINE PER 50 MG: Performed by: STUDENT IN AN ORGANIZED HEALTH CARE EDUCATION/TRAINING PROGRAM

## 2023-02-04 PROCEDURE — 87522 HEPATITIS C REVRS TRNSCRPJ: CPT | Performed by: HOSPITALIST

## 2023-02-04 PROCEDURE — 80074 ACUTE HEPATITIS PANEL: CPT | Performed by: HOSPITALIST

## 2023-02-04 PROCEDURE — 82550 ASSAY OF CK (CPK): CPT | Performed by: PSYCHIATRY & NEUROLOGY

## 2023-02-04 PROCEDURE — 83605 ASSAY OF LACTIC ACID: CPT | Performed by: HOSPITALIST

## 2023-02-04 PROCEDURE — 99231 SBSQ HOSP IP/OBS SF/LOW 25: CPT | Performed by: STUDENT IN AN ORGANIZED HEALTH CARE EDUCATION/TRAINING PROGRAM

## 2023-02-04 PROCEDURE — 99252 IP/OBS CONSLTJ NEW/EST SF 35: CPT | Performed by: HOSPITALIST

## 2023-02-04 RX ORDER — OLANZAPINE 5 MG/1
5 TABLET ORAL 2 TIMES DAILY
Status: CANCELLED | OUTPATIENT
Start: 2023-02-04

## 2023-02-04 RX ORDER — DICLOFENAC SODIUM 75 MG/1
75 TABLET, DELAYED RELEASE ORAL 2 TIMES DAILY
Status: ON HOLD | COMMUNITY
End: 2023-02-04

## 2023-02-04 RX ORDER — CHLORPROMAZINE HYDROCHLORIDE 25 MG/ML
50 INJECTION INTRAMUSCULAR ONCE
Status: DISCONTINUED | OUTPATIENT
Start: 2023-02-04 | End: 2023-02-04

## 2023-02-04 RX ORDER — LORATADINE 10 MG/1
1 TABLET ORAL
Status: ON HOLD | COMMUNITY
Start: 2023-01-19 | End: 2023-02-04

## 2023-02-04 RX ORDER — RISPERIDONE 1 MG/1
1 TABLET ORAL NIGHTLY
Status: DISCONTINUED | OUTPATIENT
Start: 2023-02-04 | End: 2023-02-08 | Stop reason: HOSPADM

## 2023-02-04 RX ORDER — POTASSIUM CHLORIDE 20 MEQ/1
40 TABLET, EXTENDED RELEASE ORAL AS NEEDED
Status: DISCONTINUED | OUTPATIENT
Start: 2023-02-04 | End: 2023-02-04

## 2023-02-04 RX ORDER — DIVALPROEX SODIUM 500 MG/1
500 TABLET, DELAYED RELEASE ORAL 2 TIMES DAILY
Status: ON HOLD | COMMUNITY
Start: 2023-01-19 | End: 2023-02-04

## 2023-02-04 RX ORDER — LEVOTHYROXINE SODIUM 0.03 MG/1
25 TABLET ORAL
Status: CANCELLED | OUTPATIENT
Start: 2023-02-05

## 2023-02-04 RX ORDER — DOXEPIN HYDROCHLORIDE 25 MG/1
50 CAPSULE ORAL 3 TIMES DAILY
Status: CANCELLED | OUTPATIENT
Start: 2023-02-04

## 2023-02-04 RX ORDER — NAPROXEN 250 MG/1
500 TABLET ORAL 2 TIMES DAILY WITH MEALS
Status: CANCELLED | OUTPATIENT
Start: 2023-02-04

## 2023-02-04 RX ORDER — BACLOFEN 10 MG/1
10 TABLET ORAL 3 TIMES DAILY PRN
Status: CANCELLED | OUTPATIENT
Start: 2023-02-04

## 2023-02-04 RX ORDER — ESOMEPRAZOLE MAGNESIUM 40 MG/1
40 CAPSULE, DELAYED RELEASE ORAL
Status: ON HOLD | COMMUNITY
End: 2023-02-04

## 2023-02-04 RX ORDER — AMOXICILLIN 250 MG
2 CAPSULE ORAL 2 TIMES DAILY
Status: ON HOLD | COMMUNITY
End: 2023-02-04

## 2023-02-04 RX ORDER — CARVEDILOL 12.5 MG/1
12.5 TABLET ORAL 2 TIMES DAILY WITH MEALS
Status: ON HOLD | COMMUNITY
End: 2023-02-04

## 2023-02-04 RX ORDER — METOCLOPRAMIDE 10 MG/1
10 TABLET ORAL 3 TIMES DAILY
Status: ON HOLD | COMMUNITY
End: 2023-02-04

## 2023-02-04 RX ORDER — AMITRIPTYLINE HYDROCHLORIDE 50 MG/1
50 TABLET, FILM COATED ORAL NIGHTLY
Status: CANCELLED | OUTPATIENT
Start: 2023-02-04

## 2023-02-04 RX ORDER — ACETAMINOPHEN AND CODEINE PHOSPHATE 300; 30 MG/1; MG/1
1 TABLET ORAL EVERY 4 HOURS PRN
Status: CANCELLED | OUTPATIENT
Start: 2023-02-04

## 2023-02-04 RX ORDER — HYDROCODONE BITARTRATE AND ACETAMINOPHEN 5; 325 MG/1; MG/1
1 TABLET ORAL EVERY 6 HOURS PRN
Status: CANCELLED | OUTPATIENT
Start: 2023-02-04

## 2023-02-04 RX ORDER — ACETAMINOPHEN AND CODEINE PHOSPHATE 300; 30 MG/1; MG/1
1 TABLET ORAL EVERY 4 HOURS PRN
Status: ON HOLD | COMMUNITY
End: 2023-02-04

## 2023-02-04 RX ORDER — AMITRIPTYLINE HYDROCHLORIDE 50 MG/1
50 TABLET, FILM COATED ORAL NIGHTLY
Status: ON HOLD | COMMUNITY
End: 2023-02-04

## 2023-02-04 RX ORDER — ARIPIPRAZOLE 10 MG/1
5 TABLET ORAL DAILY
Status: CANCELLED | OUTPATIENT
Start: 2023-02-05

## 2023-02-04 RX ORDER — OLANZAPINE 5 MG/1
5 TABLET ORAL 2 TIMES DAILY
Status: ON HOLD | COMMUNITY
End: 2023-02-04

## 2023-02-04 RX ORDER — HYDROCODONE BITARTRATE AND ACETAMINOPHEN 5; 325 MG/1; MG/1
1 TABLET ORAL EVERY 6 HOURS PRN
Status: ON HOLD | COMMUNITY
End: 2023-02-04

## 2023-02-04 RX ORDER — ONDANSETRON HYDROCHLORIDE 8 MG/1
TABLET, FILM COATED ORAL EVERY 8 HOURS PRN
Status: ON HOLD | COMMUNITY
End: 2023-02-04

## 2023-02-04 RX ORDER — ONDANSETRON HYDROCHLORIDE 8 MG/1
TABLET, FILM COATED ORAL EVERY 8 HOURS PRN
Status: CANCELLED | OUTPATIENT
Start: 2023-02-04

## 2023-02-04 RX ORDER — ESTRADIOL 0.5 MG/1
0.5 TABLET ORAL DAILY
Status: ON HOLD | COMMUNITY
End: 2023-02-04

## 2023-02-04 RX ORDER — CARVEDILOL 6.25 MG/1
12.5 TABLET ORAL 2 TIMES DAILY WITH MEALS
Status: CANCELLED | OUTPATIENT
Start: 2023-02-04

## 2023-02-04 RX ORDER — CHLORPROMAZINE HYDROCHLORIDE 25 MG/ML
50 INJECTION INTRAMUSCULAR ONCE
Status: COMPLETED | OUTPATIENT
Start: 2023-02-04 | End: 2023-02-04

## 2023-02-04 RX ORDER — ARIPIPRAZOLE 5 MG/1
5 TABLET ORAL DAILY
Status: ON HOLD | COMMUNITY
End: 2023-02-04

## 2023-02-04 RX ORDER — LEVOTHYROXINE SODIUM 0.03 MG/1
25 TABLET ORAL
Status: ON HOLD | COMMUNITY
End: 2023-02-04

## 2023-02-04 RX ORDER — CLONAZEPAM 1 MG/1
1 TABLET ORAL 3 TIMES DAILY PRN
Status: CANCELLED | OUTPATIENT
Start: 2023-02-04 | End: 2023-02-11

## 2023-02-04 RX ORDER — DOXEPIN HYDROCHLORIDE 50 MG/1
50 CAPSULE ORAL 3 TIMES DAILY
Status: ON HOLD | COMMUNITY
End: 2023-02-04

## 2023-02-04 RX ORDER — ESTRADIOL 1 MG/1
0.5 TABLET ORAL DAILY
Status: CANCELLED | OUTPATIENT
Start: 2023-02-05

## 2023-02-04 RX ORDER — PANTOPRAZOLE SODIUM 40 MG/1
40 TABLET, DELAYED RELEASE ORAL
Status: CANCELLED | OUTPATIENT
Start: 2023-02-05

## 2023-02-04 RX ORDER — TRAZODONE HYDROCHLORIDE 100 MG/1
100 TABLET ORAL 3 TIMES DAILY
Status: ON HOLD | COMMUNITY
End: 2023-02-04

## 2023-02-04 RX ORDER — BACLOFEN 10 MG/1
10 TABLET ORAL 3 TIMES DAILY PRN
Status: ON HOLD | COMMUNITY
End: 2023-02-04

## 2023-02-04 RX ORDER — POTASSIUM CHLORIDE 20 MEQ/1
40 TABLET, EXTENDED RELEASE ORAL EVERY 4 HOURS
Status: DISCONTINUED | OUTPATIENT
Start: 2023-02-04 | End: 2023-02-04

## 2023-02-04 RX ORDER — TRAZODONE HYDROCHLORIDE 50 MG/1
100 TABLET ORAL 3 TIMES DAILY
Status: CANCELLED | OUTPATIENT
Start: 2023-02-04

## 2023-02-04 RX ORDER — AMOXICILLIN 250 MG
2 CAPSULE ORAL 2 TIMES DAILY
Status: CANCELLED | OUTPATIENT
Start: 2023-02-04

## 2023-02-04 RX ORDER — SERTRALINE HYDROCHLORIDE 100 MG/1
100 TABLET, FILM COATED ORAL DAILY
Status: ON HOLD | COMMUNITY
End: 2023-02-04

## 2023-02-04 RX ORDER — METOCLOPRAMIDE 10 MG/1
10 TABLET ORAL 3 TIMES DAILY
Status: CANCELLED | OUTPATIENT
Start: 2023-02-04

## 2023-02-04 RX ORDER — CLONAZEPAM 1 MG/1
1 TABLET ORAL 3 TIMES DAILY PRN
Status: ON HOLD | COMMUNITY
End: 2023-02-04

## 2023-02-04 RX ORDER — POTASSIUM CHLORIDE 1.5 G/1.77G
40 POWDER, FOR SOLUTION ORAL AS NEEDED
Status: DISCONTINUED | OUTPATIENT
Start: 2023-02-04 | End: 2023-02-04

## 2023-02-04 RX ADMIN — CHLORPROMAZINE HYDROCHLORIDE 50 MG: 25 INJECTION INTRAMUSCULAR at 00:47

## 2023-02-04 NOTE — ED PROVIDER NOTES
Subjective   History of Present Illness  32-year-old male who presents to the ED today for a mental health evaluation.  He states he has been going through a lot of struggles recently and has been having difficulty coping.  He states due to this he relapsed on methamphetamine about a week ago.  He states prior to that he had been sober for 5 months.  He believes his last use was about 3 days ago.  He denies any suicidal or homicidal ideations.  He denies any alcohol use.  He states he is not using any benzodiazepines or opiates.  He states he has not been eating or sleeping.  Just prior to my evaluation the patient became very agitated and ran out of the ER and had to be brought back into the ED by security.    History provided by:  Patient  Drug / Alcohol Assessment  Primary symptoms include agitation. Primary symptoms comment: requesting detox. This is a new problem. The current episode started yesterday. The problem has been gradually worsening. Suspected agents include methamphetamines. Pertinent negatives include no nausea and no vomiting. Associated medical issues include addiction treatment and psychiatric history.       Review of Systems   Constitutional: Positive for appetite change.   HENT: Negative.    Eyes: Negative.    Respiratory: Negative.    Cardiovascular: Negative.    Gastrointestinal: Negative for nausea and vomiting.   Genitourinary: Negative.    Musculoskeletal: Negative.    Skin: Negative.    Neurological: Negative.    Psychiatric/Behavioral: Positive for agitation and sleep disturbance. Negative for suicidal ideas.   All other systems reviewed and are negative.      Past Medical History:   Diagnosis Date   • Bipolar 1 disorder (HCC) 10/23/2020       No Known Allergies    Past Surgical History:   Procedure Laterality Date   • ORIF ELBOW FRACTURE Left 2016       Family History   Problem Relation Age of Onset   • Anxiety disorder Mother    • No Known Problems Father        Social History      Socioeconomic History   • Marital status: Single   Tobacco Use   • Smoking status: Former     Packs/day: 0.00     Types: Cigarettes     Quit date: 10/15/2021     Years since quittin.3   • Smokeless tobacco: Never   Substance and Sexual Activity   • Drug use: Yes     Types: Methamphetamines           Objective   Physical Exam  Vitals and nursing note reviewed.   Constitutional:       General: He is not in acute distress.     Appearance: Normal appearance.   HENT:      Head: Normocephalic and atraumatic.      Right Ear: External ear normal.      Left Ear: External ear normal.   Eyes:      Conjunctiva/sclera: Conjunctivae normal.      Pupils: Pupils are equal, round, and reactive to light.   Cardiovascular:      Rate and Rhythm: Regular rhythm. Tachycardia present.      Pulses: Normal pulses.      Heart sounds: Normal heart sounds.   Pulmonary:      Effort: Pulmonary effort is normal.      Breath sounds: Normal breath sounds.   Abdominal:      General: Bowel sounds are normal.      Palpations: Abdomen is soft.   Musculoskeletal:         General: Normal range of motion.      Cervical back: Normal range of motion and neck supple.   Skin:     General: Skin is warm and dry.      Capillary Refill: Capillary refill takes less than 2 seconds.   Neurological:      General: No focal deficit present.      Mental Status: He is alert and oriented to person, place, and time.   Psychiatric:         Mood and Affect: Mood is anxious.         Speech: Speech is tangential.         Behavior: Behavior is hyperactive.         Thought Content: Thought content is paranoid. Thought content does not include homicidal or suicidal ideation.         Judgment: Judgment is impulsive.         Procedures           ED Course  ED Course as of 23   183 Intake nurse advised patient will be placed on a 72 hour hold []   1903 Medically clear for psych []      ED Course User Index  [] Karli Moya, PA                                            Medical Decision Making  32-year-old male presents to the ED today for mental health evaluation and possible detox from methamphetamine.  He states he recently relapsed on methamphetamine about 1 week ago due to struggles he has been going through.  He was medically clear for a psychiatric evaluation.  Psychiatry was consulted.  The patient was placed on a 72-hour hold and will be admitted.    Psychosis, unspecified psychosis type (HCC): complicated acute illness or injury  Substance abuse (HCC): complicated acute illness or injury  Amount and/or Complexity of Data Reviewed  Labs: ordered.      Risk  Prescription drug management.  Decision regarding hospitalization.          Final diagnoses:   Psychosis, unspecified psychosis type (HCC)   Substance abuse (HCC)       ED Disposition  ED Disposition     ED Disposition   DC/Transfer to Behavioral Health Condition   Stable    Comment   --             No follow-up provider specified.       Medication List      No changes were made to your prescriptions during this visit.          Karli Moya PA  02/03/23 2007

## 2023-02-04 NOTE — NURSING NOTE
2nd face to face assessment    Patient continues to  periodically pull at restraints. Patient will not answer questions. No signs or symptoms of physical distress noted. MD recommendations were to continue restraints until patient is calm and no longer pulling at restraints.
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
## 2023-02-04 NOTE — NURSING NOTE
Pt walking in hallways, escorted back to his room by FRANCISCO French. Pt laid on bed and then began flopping back and forth from one edge of the bed to the other. Pt lifting feet into the air, kicking out at staff, swiping hands across the chest of staff, groping. Staff attempted to talk to pt, pt ignored staff.
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
## 2023-02-04 NOTE — NURSING NOTE
Dr. West arrived to Unit to see pt. Orders put in for labs stat and repeat labs and EKG in AM. This RN rounded with Dr. West at this time. Dr. West requesting pt not be given any more Haldol or Thorazine at this time.
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
## 2023-02-04 NOTE — NURSING NOTE
"Spoke with Dr. West at this time who states, \"I am on my own here. I have a hospital full of sick patients. I am not coming in a hurry to see your patient. [facility] is it's own hospital. You have your own doctors. Can't your doctor see the pt?\" Explained to Dr. West that at night, there is no way that this RN knows of for the [facility] doctors to physically see the patients. Dr. West replied, \"You really need to get video monitoring so your doctors can see their own patients. I have 90 sick patients over here. Everyone takes advantage of the Hospitalist and there is only one of us.\"   "

## 2023-02-04 NOTE — NURSING NOTE
This RN responded to pt's room to assist LPN in safety of pt and to assess pt's situation. Pt continued to roll, flip, flop, and kick and swipe out at staff. Pt occasionally would smile, then go back to rolling and kicking. This RN and FRANCISCO French stood on either side of the pt's bed to be sure pt did not roll off into the floor. Pt lifting feet as high as possible ad kicking out at staff, grabbing the chest of staff. Alvaro Odonnell Lead RN and Security called at this time.

## 2023-02-04 NOTE — NURSING NOTE
One hour face to face assessment    Patient continues to yell and mumble at staff. Patient unwilling to open eyes or answer any questions.  Patient continues to pull and attempt to get out of restraints. No signs or symptoms of physical distress noted.MD was notified of the face-to-face assessment.  MD recommendations were to continue restraints until the patient is calm.

## 2023-02-04 NOTE — NURSING NOTE
Due to pt's inability/unwillingness to speak to staff and pt continuing to thrash in restraints, Dr. Renee contacted at this time. Unable to accurately record pt's V/S. Orders to give dose of Thorazine and consult Hospitalist d/t pt's altered mental status and HR of 160.

## 2023-02-04 NOTE — NURSING NOTE
Due to pt's continued agitation and thrashing in restraints, Dr. Renee called at this time to report pt condition. New orders given for PRN One time dose of Thorazine 50mg Staff present with pt.

## 2023-02-04 NOTE — NURSING NOTE
"Patient was bought to ED by [facility] for evaluation. Patient was admitted on a 72-hour hold beginning 2/3/23 @1857, Ending 2/8/23 @ 1857. He reports that he has been \"walking and thinking a lot\" and has \"not been able to get his thoughts to stop\". He is hyperactive and unable to sit still during the assessment. He is easily distracted, staring off in space, and returning to the conversation to ask what the question was. He mumbles and talks to himself, apoligizing and saying \"I shouldn't have said that\". He reports relaspe on meth one week ago after being clean for 5 months. He had been living with a friend until going to [facility]. He rates anxiety at 7/10, depression at 3/10 and denies craving. He reports little to eat in the last several days, he reports no sleep in 2-3 days. He reports hx of cutting 7/8 years ago and has scarring to left inner forearm.  "

## 2023-02-04 NOTE — NURSING NOTE
Rechecked pt's extremities for redness and raw spots from time in restraints. All redness appears gone from ankles and wrists at this time. Pt has 2 small areas to Lt outer ankle where top layer of skin has been rubbed, but skin is not reddened any longer. No complaints voiced by pt.

## 2023-02-04 NOTE — NURSING NOTE
Head to toe assessment completed at this time. Pt has red marks on ankles and wrists from movement while in restraints. Pt has 2 small thierno around Lt ankle where skin has been scubbed raw from trying to use Rt foot to remove restraints. No open wounds, no pt complaints at this time.

## 2023-02-04 NOTE — NURSING NOTE
Pt resting quietly. Dr. Renee contacted and orders for pt to be removed from restraints. Dr. Renee sees no need for pt to remain 1:1 monitoring at this time. Pt to have V/S taken 1 hr post removal of restraints, orders given for repeat EKG, CMP and CK in AM. Hospitalist consult to be discontinued at this time as pt appears to be calm and resting. Reevaluate for consult if condition worsens.

## 2023-02-04 NOTE — CONSULTS
"Hospitalist Consult Note        Patient Identification  Name: Hebert Pedraza  Age/Sex: 32 y.o. 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 Over the course of the night, patient's psychosis reportedly worsened and he received IM haldol, benadryl, ativan and thorazine. Despite these medications, he continued to be restless, combative and thrashing in his bed. He stopped speaking after these meds were given as well. He had to be put in restraints temporarily during this time. His heart rate reportedly darin to 160 but accuracy was questionable as he was still thrashing in bed, and further an EKG performed at that time showed a HR of 118. After reportedly about an hour and a half after the above medications were given, the patient ultimately stopped thrashing and fell asleep. Hospitalist service was consulted for tachycardia and \"to make sure the patient was ok.\" Patient was asleep during my evaluation. Vitals were stable with HR in the 100s, BP 96/56, RR 18 and O2 saturation 97% on room air. He is currently not waking to voice; he will stir and start moving his extremities back and forth in bed when examined which limits exam but will not open his eyes or respond verbally.     Review of Systems  Review of Systems   Unable to perform ROS: Patient unresponsive       History  Past Medical History:   Diagnosis Date   • Bipolar 1 disorder (HCC) 10/23/2020   • Substance abuse (HCC)       Past Surgical History:   Procedure Laterality Date   • ORIF ELBOW FRACTURE Left    ,  Family History   Problem Relation " Age of Onset   • Anxiety disorder Mother    • No Known Problems Father       Social History     Tobacco Use   • Smoking status: Former     Packs/day: 0.00     Types: Cigarettes     Quit date: 10/15/2021     Years since quittin.3   • Smokeless tobacco: Never   Vaping Use   • Vaping Use: Every day   • Substances: Nicotine, Flavoring   • Devices: Disposable, Pre-filled or refillable cartridge, Refillable tank   Substance Use Topics   • Alcohol use: Not Currently   • Drug use: Yes     Types: Methamphetamines      ALLERGIES: Patient has no known allergies.    Objective     Vital Signs   Temp:  [97.2 °F (36.2 °C)-98.6 °F (37 °C)] 98.6 °F (37 °C)  Heart Rate:  [] 108  Resp:  [15-18] 18  BP: ()/(56-78) 96/56      23   Weight: 65.8 kg (145 lb 1.6 oz)     Body mass index is 21.43 kg/m².    Physical Exam:  Physical Exam  Constitutional:       Comments: Sleeping soundly, will not open eyes or speak to voice, will stir in bed and move extremities back and forth when exam attempted (which limits exam) but will still not open eyes or speak   HENT:      Head: Normocephalic and atraumatic.      Right Ear: External ear normal.      Left Ear: External ear normal.   Eyes:      Comments: Unable to examine (resists exam)   Cardiovascular:      Rate and Rhythm: Regular rhythm. Tachycardia present.      Pulses: Normal pulses.      Heart sounds: Normal heart sounds.   Pulmonary:      Effort: Pulmonary effort is normal.      Breath sounds: Normal breath sounds. No wheezing, rhonchi or rales.   Abdominal:      Comments: Lying in fetal position, when attempt made to palpate abdomen, patient began to stir again.    Musculoskeletal:         General: Normal range of motion.      Cervical back: Normal range of motion and neck supple.      Comments: Moves all 4 extremities when exam is attempted   Skin:     General: Skin is warm and dry.      Comments: Difficult to examine completely as patient is clothed and not  cooperating with exam   Neurological:      Comments: Sleeping, not opening eyes or speaking to voice but moving extremities back and forth in bed when exam is attempted.    Psychiatric:      Comments: Unable to assess         Results Review:    Results from last 7 days   Lab Units 02/03/23  1822   WBC 10*3/mm3 8.04   HEMOGLOBIN g/dL 13.4   PLATELETS 10*3/mm3 354     Results from last 7 days   Lab Units 02/03/23  1822   CRP mg/dL 0.47     Results from last 7 days   Lab Units 02/03/23  1822   SODIUM mmol/L 136   POTASSIUM mmol/L 3.1*   CHLORIDE mmol/L 99   CO2 mmol/L 18.3*   BUN mg/dL 18   CREATININE mg/dL 1.24   CALCIUM mg/dL 9.7   GLUCOSE mg/dL 145*     Results from last 7 days   Lab Units 02/03/23  1822   MAGNESIUM mg/dL 2.3     No results found for: HGBA1C  Results from last 7 days   Lab Units 02/03/23  1822   BILIRUBIN mg/dL 0.7   ALK PHOS U/L 81   AST (SGOT) U/L 44*   ALT (SGPT) U/L 33                        I have reviewed the patient's laboratory results.     Imaging Results (Last 72 Hours)     ** No results found for the last 72 hours. **        I have personally reviewed the patient's radiologic imaging.        EKG:   Sinus tachycardia, , QTc 504 (prolonged)  Possible Left atrial enlargement  Borderline ECG  No previous ECGs available    I have personally reviewed the patient's EKG. No overt ST changes appreciated. QTc is prolonged.         Assessment & Plan     - Psychosis (HCC), suspect methamphetamine abuse, with psychosis worsening after admission to [facility] earlier tonight. Ultimately responded to IM benadryl, haldol, ativan and thorazine but response was delayed by 1.5 hours, and during that time he became nonverbal but continued to thrash in bed and attempt to kick staff requiring restraints temporarily. Now sleeping, not opening eyes or speaking but will stir some in bed when exam attempted. HR reportedly up to 160 when patient was combative and being restrained, but suspect this  reading which was from an automated BP cuff was spurious, as EKG obtained at the same time showed HR in the 110s. Continue to closely monitor in the [facility]. Would recommend caution with QT prolonging meds including haldol and thorazine, as QTc is already >500 on this morning's EKG. Will go ahead and discontinue PRN trazodone so that it cannot be given and further prolong QTc. Recommend replacing K+ (reportedly 40mEQ PO given in the ED) as doing so could shorten QTc. Oral replacement protocol ordered; will repeat labs later this morning and replace if indicated (presuming patient is awake enough to take oral replacement by then). Magnesium was normal in the ED.   - Hypokalemia: see above.  - QT prolongation: see above. Repeat EKG scheduled for later this morning.   - Low bicarb: bicarb 18.3 in the ED. Anion gap elevated at 18. Not in acute renal failure. Check lactate. Repeat labs later this morning.   - Mild AST elevation: HCV qualitative seen was positive on 1/9/23; specimen was supposed to be sent for confirmation by RT-PCR. Will perform in-house acute hepatitis panel now and if positive will check viral load.   - Bipolar 1 disorder: management per psychiatry.     Thank you for the consultation. Will continue to follow the patient.     I discussed the patient's findings and my recommendations with patient's RN Minerva.     Michael Roberson MD  02/04/23  03:35 EST
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
## 2023-02-04 NOTE — NURSING NOTE
This RN arrived in pt's room to assess situation and assist LPN. Pt kicking, flailing around bed, rolling from one side to the other, lifting legs above head and kicking out at staff. Decision made to call for Security and Lead RN, Belle. RAFAL Gleason made telephone call.

## 2023-02-04 NOTE — PLAN OF CARE
Problem: Behavior Regulation Impairment (Psychotic Signs/Symptoms)  Goal: Improved Behavioral Control (Psychotic Signs/Symptoms)  Outcome: Ongoing, Not Progressing   Goal Outcome Evaluation:  Plan of Care Reviewed With: patient  Patient Agreement with Plan of Care: unable to participate (sleeping all day)     Progress: no change

## 2023-02-05 LAB
ALBUMIN SERPL-MCNC: 3.8 G/DL (ref 3.5–5.2)
ALBUMIN/GLOB SERPL: 1.3 G/DL
ALP SERPL-CCNC: 76 U/L (ref 39–117)
ALT SERPL W P-5'-P-CCNC: 23 U/L (ref 1–41)
ANION GAP SERPL CALCULATED.3IONS-SCNC: 9.3 MMOL/L (ref 5–15)
AST SERPL-CCNC: 27 U/L (ref 1–40)
BILIRUB SERPL-MCNC: 0.4 MG/DL (ref 0–1.2)
BUN SERPL-MCNC: 27 MG/DL (ref 6–20)
BUN/CREAT SERPL: 24.1 (ref 7–25)
CALCIUM SPEC-SCNC: 9.2 MG/DL (ref 8.6–10.5)
CHLORIDE SERPL-SCNC: 104 MMOL/L (ref 98–107)
CK SERPL-CCNC: 831 U/L (ref 20–200)
CO2 SERPL-SCNC: 25.7 MMOL/L (ref 22–29)
CREAT SERPL-MCNC: 1.12 MG/DL (ref 0.76–1.27)
DEPRECATED RDW RBC AUTO: 44.1 FL (ref 37–54)
EGFRCR SERPLBLD CKD-EPI 2021: 89.5 ML/MIN/1.73
ERYTHROCYTE [DISTWIDTH] IN BLOOD BY AUTOMATED COUNT: 13.5 % (ref 12.3–15.4)
GLOBULIN UR ELPH-MCNC: 3 GM/DL
GLUCOSE SERPL-MCNC: 96 MG/DL (ref 65–99)
HCT VFR BLD AUTO: 42.1 % (ref 37.5–51)
HGB BLD-MCNC: 13.9 G/DL (ref 13–17.7)
MCH RBC QN AUTO: 29.8 PG (ref 26.6–33)
MCHC RBC AUTO-ENTMCNC: 33 G/DL (ref 31.5–35.7)
MCV RBC AUTO: 90.3 FL (ref 79–97)
PLATELET # BLD AUTO: 327 10*3/MM3 (ref 140–450)
PMV BLD AUTO: 9.3 FL (ref 6–12)
POTASSIUM SERPL-SCNC: 4.6 MMOL/L (ref 3.5–5.2)
PROT SERPL-MCNC: 6.8 G/DL (ref 6–8.5)
QT INTERVAL: 420 MS
QTC INTERVAL: 433 MS
RBC # BLD AUTO: 4.66 10*6/MM3 (ref 4.14–5.8)
SODIUM SERPL-SCNC: 139 MMOL/L (ref 136–145)
WBC NRBC COR # BLD: 7.06 10*3/MM3 (ref 3.4–10.8)

## 2023-02-05 PROCEDURE — 82550 ASSAY OF CK (CPK): CPT | Performed by: STUDENT IN AN ORGANIZED HEALTH CARE EDUCATION/TRAINING PROGRAM

## 2023-02-05 PROCEDURE — 99232 SBSQ HOSP IP/OBS MODERATE 35: CPT | Performed by: PSYCHIATRY & NEUROLOGY

## 2023-02-05 PROCEDURE — 93010 ELECTROCARDIOGRAM REPORT: CPT | Performed by: INTERNAL MEDICINE

## 2023-02-05 PROCEDURE — 93005 ELECTROCARDIOGRAM TRACING: CPT | Performed by: PSYCHIATRY & NEUROLOGY

## 2023-02-05 PROCEDURE — 99231 SBSQ HOSP IP/OBS SF/LOW 25: CPT | Performed by: STUDENT IN AN ORGANIZED HEALTH CARE EDUCATION/TRAINING PROGRAM

## 2023-02-05 PROCEDURE — 85027 COMPLETE CBC AUTOMATED: CPT | Performed by: PSYCHIATRY & NEUROLOGY

## 2023-02-05 PROCEDURE — 80053 COMPREHEN METABOLIC PANEL: CPT | Performed by: STUDENT IN AN ORGANIZED HEALTH CARE EDUCATION/TRAINING PROGRAM

## 2023-02-05 RX ORDER — BUPRENORPHINE HYDROCHLORIDE AND NALOXONE HYDROCHLORIDE DIHYDRATE 8; 2 MG/1; MG/1
0.25 TABLET SUBLINGUAL DAILY
Status: CANCELLED | OUTPATIENT
Start: 2023-02-05

## 2023-02-05 RX ORDER — BUPRENORPHINE HYDROCHLORIDE AND NALOXONE HYDROCHLORIDE DIHYDRATE 2; .5 MG/1; MG/1
1 TABLET SUBLINGUAL DAILY
COMMUNITY
Start: 2023-01-26 | End: 2023-02-08 | Stop reason: HOSPADM

## 2023-02-05 NOTE — PLAN OF CARE
Goal Outcome Evaluation:  Plan of Care Reviewed With: patient  Patient Agreement with Plan of Care: agrees     Progress: improving  Outcome Evaluation: PT was calm and cooperative this shift and had no issues or concerns.

## 2023-02-05 NOTE — NURSING NOTE
Spoke with Dr. West at this time about consult on pt reporting mental status change, HR in 160’s on monitoring, and pt’s inability to focus on staff. All information provided to Dr. Renee and consult ordered. Dr. West states he currently has many sick patients at this time and will be over as soon as he can.

## 2023-02-05 NOTE — PROGRESS NOTES
"      Inpatient Psych Progress Note     Clinician: Javier Sears MD  Admission Date: 2/3/2023  08:32 EST 02/05/23    Behavioral Health Treatment Plan and Problem List: I have reviewed and approved the Behavioral Health Treatment Plan and Problem list.    Allergies  No Known Allergies    Hospital Day: 2 days      Assessment completed within view of staff    History  CC/clinical focus: psychosis     Interval HPI: Patient seen and evaluated by me.  Chart reviewed. Pt resting in bed during visit today. He is more responsive today, but still very lethargic. He does verbalize that he is feeling some better. He denies paranoia or hearing voices. When questioned about Risperdal, he states that he has taken this before but does not answer if it has worked well for him in the past. He is unable to tell me what medications have worked well for him before. He is unable to tell me the correct day of the week. He does know the month and year. He quickly goes back to sleep after visit.   Med Compliant.  ROS otherwise as below.      Interval Review of Systems:   General ROS: negative for - fever or malaise  Endocrine ROS: negative for - palpitations  Respiratory ROS: no cough, shortness of breath, or wheezing  Cardiovascular ROS: no chest pain or dyspnea on exertion  Gastrointestinal ROS: no abdominal pain,no black or bloody stools    /85 (BP Location: Right arm, Patient Position: Sitting)   Pulse 91   Temp 97.6 °F (36.4 °C) (Temporal)   Resp 18   Ht 175.3 cm (69\")   Wt 65.8 kg (145 lb 1.6 oz)   SpO2 99%   BMI 21.43 kg/m²     Mental Status Exam  Mood: dysphoric  Affect: dysphoric   Thought Processes: unable to demonstrate  Thought Content: not demonstrated today   Hallucinations: denies  Suicidal Thoughts: denies  Suicidal Plan/Intent: denies  Hopelesness:no  Homicidal Thoughts:  denies      Medical Decision Making:   Labs:     Lab Results (last 24 hours)     Procedure Component Value Units Date/Time    Comprehensive " Metabolic Panel [[id number]]  (Abnormal) Collected: 02/05/23 0547    Specimen: Blood Updated: 02/05/23 0637     Glucose 96 mg/dL      BUN 27 mg/dL      Creatinine 1.12 mg/dL      Sodium 139 mmol/L      Potassium 4.6 mmol/L      Chloride 104 mmol/L      CO2 25.7 mmol/L      Calcium 9.2 mg/dL      Total Protein 6.8 g/dL      Albumin 3.8 g/dL      ALT (SGPT) 23 U/L      AST (SGOT) 27 U/L      Alkaline Phosphatase 76 U/L      Total Bilirubin 0.4 mg/dL      Globulin 3.0 gm/dL      A/G Ratio 1.3 g/dL      BUN/Creatinine Ratio 24.1     Anion Gap 9.3 mmol/L      eGFR 89.5 mL/min/1.73     Narrative:      GFR Normal >60  Chronic Kidney Disease <60  Kidney Failure <15      CK [[id number]]  (Abnormal) Collected: 02/05/23 0547    Specimen: Blood Updated: 02/05/23 0637     Creatine Kinase 831 U/L     CBC (No Diff) [[id number]]  (Normal) Collected: 02/05/23 0547    Specimen: Blood Updated: 02/05/23 0617     WBC 7.06 10*3/mm3      RBC 4.66 10*6/mm3      Hemoglobin 13.9 g/dL      Hematocrit 42.1 %      MCV 90.3 fL      MCH 29.8 pg      MCHC 33.0 g/dL      RDW 13.5 %      RDW-SD 44.1 fl      MPV 9.3 fL      Platelets 327 10*3/mm3     CK [[id number]]  (Abnormal) Collected: 02/04/23 1938    Specimen: Blood Updated: 02/04/23 2011     Creatine Kinase 1,041 U/L     Basic Metabolic Panel [[id number]]  (Abnormal) Collected: 02/04/23 1938    Specimen: Blood Updated: 02/04/23 2011     Glucose 93 mg/dL      BUN 20 mg/dL      Creatinine 1.24 mg/dL      Sodium 146 mmol/L      Potassium 3.7 mmol/L      Chloride 109 mmol/L      CO2 24.8 mmol/L      Calcium 9.2 mg/dL      BUN/Creatinine Ratio 16.1     Anion Gap 12.2 mmol/L      eGFR 79.2 mL/min/1.73     Narrative:      GFR Normal >60  Chronic Kidney Disease <60  Kidney Failure <15      CK [[id number]]  (Abnormal) Collected: 02/04/23 0411    Specimen: Blood Updated: 02/04/23 1435     Creatine Kinase 1,592 U/L             Radiology:     Imaging Results (Last 24 Hours)     ** No results found for the last  24 hours. **            EKG:     ECG/EMG Results (most recent)     Procedure Component Value Units Date/Time    ECG 12 Lead Other [[id number]] Collected: 02/04/23 0101     Updated: 02/04/23 1019     QT Interval 360 ms      QTC Interval 504 ms     Narrative:      Test Reason : Baseline Cardiac Status~  Blood Pressure :   */*   mmHG  Vent. Rate : 118 BPM     Atrial Rate : 118 BPM     P-R Int : 118 ms          QRS Dur :  94 ms      QT Int : 360 ms       P-R-T Axes :  76  81  66 degrees     QTc Int : 504 ms    Sinus tachycardia  Possible Left atrial enlargement  Borderline ECG  No previous ECGs available  Confirmed by Luba Olmstead ([id number]) on 2/4/2023 10:19:05 AM    Referred By:            Confirmed By: Luba Olmstead           Medications:  nicotine, 1 patch, Transdermal, Q24H  risperiDONE, 1 mg, Oral, Nightly  sodium chloride, 1,000 mL, Intravenous, Once           All medications reviewed.      Assessment and Plan:     Psychosis (HCC)  h/o Dx of Bipolar I Disorder  - Admit for safety and stabilization  - SP3  - Continue trial of Risperdal 1mg QHS  - Obtain additional collateral to determine medications that he has responded to the best - will need to discuss with pt when state of mind improves         Sedation  - Patient experienced episodes of agitation overnight on 02/03/23, received PRN medication.  There was some concern regarding patient's level of responsiveness after the PRN medication was provided and hospitalist was consulted.    - Consult note reviewed.  - CK level was 1620 at 0330 on 02/04/23, CK is trending down and improved at 831 as of 02/05/23.   - Patient's temperature is WNL and he is not demonstrating signs of rigidity  - Will continue to trend CK levels. Check CK level in AM        Hypokalemia  - Replacement per protocol  - K wnl at 4.6 as of 02/05/23.      Prolonged QTc  - QTc 504 at 101AM on 2/4  - Repeat EKG on 2/5/23 shows improved QTc of 433.  - Holding trazodone, zofran, hydroxyzine        Hep C  Positive  -  patient when his mental status improves         Continue hospitalization for safety and stabilization.  Continue current level of Special Precautions (q15 minute checks).        This note was generated by a scribe, Reggie Gomes. The work documented in this note was completed, reviewed, and approved by the attending psychiatrist as designated Dr. Javier Sears electronic signature.     IJavier MD, personally performed the services described in this documentation as scribed by the above named individual and is both accurate and complete.

## 2023-02-05 NOTE — PLAN OF CARE
Problem: Adult Behavioral Health Plan of Care  Goal: Plan of Care Review  Outcome: Ongoing, Progressing  Flowsheets (Taken 2/5/2023 1728)  Progress: improving  Plan of Care Reviewed With: patient  Patient Agreement with Plan of Care: agrees   Goal Outcome Evaluation:  Plan of Care Reviewed With: patient  Patient Agreement with Plan of Care: agrees     Progress: improving

## 2023-02-05 NOTE — PROGRESS NOTES
[facility] HOSPITALIST PROGRESS NOTE     Patient Identification:  Name:  Hebert Pedraza  Age:  32 y.o.  Sex:  male  :  1990  MRN:  [id number]  Visit Number:  [id number]  ROOM: 1019/     Primary Care Provider:  Nestor Milton MD    Length of stay in inpatient status:  1    Subjective     Chief Complaint:    Psychosis and agitation    History of Presenting Illness:    Patient seen and examined with nursing staff present in room 1019. Nursing staff reports he has been sleeping most of the day but will get up and use the bathroom. He has not cooperated with activities and has refused IV fluids that were ordered this morning. He was sleeping when we entered the room and moved around when spoken to, but would not answer questions.    Objective     Current Hospital Meds:nicotine, 1 patch, Transdermal, Q24H  risperiDONE, 1 mg, Oral, Nightly  sodium chloride, 1,000 mL, Intravenous, Once         Current Antimicrobial Therapy:  Anti-Infectives (From admission, onward)    None        Current Diuretic Therapy:  Diuretics (From admission, onward)    None        ----------------------------------------------------------------------------------------------------------------------  Vital Signs:  Temp:  [97 °F (36.1 °C)-98.7 °F (37.1 °C)] 97 °F (36.1 °C)  Heart Rate:  [] 95  Resp:  [16-18] 18  BP: ()/(54-63) 100/63  SpO2:  [95 %-98 %] 95 %  on   ;   Device (Oxygen Therapy): room air  Body mass index is 21.43 kg/m².    Wt Readings from Last 3 Encounters:   23 65.8 kg (145 lb 1.6 oz)   23 65.8 kg (145 lb)   10/29/21 72.6 kg (160 lb)     Intake & Output (last 3 days)     None        Diet: Regular/House Diet; Texture: Regular Texture (IDDSI 7); Fluid Consistency: Thin (IDDSI 0)  ----------------------------------------------------------------------------------------------------------------------  Physical exam:   Constitutional:  Well-developed and well-nourished.  No acute distress.       HENT:  Head:  Normocephalic and atraumatic.    Pulmonary/Chest:  Normal rate and effort  Musculoskeletal:  No deformity.  No red or swollen joints anywhere.    Neurological:  Asleep, stirs to verbal stimulation but does not participate and is not cooperative    Skin:  Skin is warm and dry.   Peripheral vascular:  No cyanosis, no edema.  Psychiatric: not cooperative  Edited by: Tamy Ghotra DO at 2/4/2023 1913  ----------------------------------------------------------------------------------------------------------------------  Results from last 7 days   Lab Units 02/04/23  0330 02/03/23  1822   CRP mg/dL  --  0.47   LACTATE mmol/L 0.8  --    WBC 10*3/mm3 8.75 8.04   HEMOGLOBIN g/dL 13.0 13.4   HEMATOCRIT % 40.2 40.6   MCV fL 92.4 88.8   MCHC g/dL 32.3 33.0   PLATELETS 10*3/mm3 292 354         Results from last 7 days   Lab Units 02/04/23  0330 02/03/23  1822   SODIUM mmol/L 139 136   POTASSIUM mmol/L 3.6 3.1*   MAGNESIUM mg/dL  --  2.3   CHLORIDE mmol/L 106 99   CO2 mmol/L 20.6* 18.3*   BUN mg/dL 22* 18   CREATININE mg/dL 1.33* 1.24   CALCIUM mg/dL 9.2 9.7   GLUCOSE mg/dL 96 145*   ALBUMIN g/dL 3.8 4.5   BILIRUBIN mg/dL 0.5 0.7   ALK PHOS U/L 75 81   AST (SGOT) U/L 49* 44*   ALT (SGPT) U/L 29 33   Estimated Creatinine Clearance: 74.2 mL/min (A) (by C-G formula based on SCr of 1.33 mg/dL (H)).  No results found for: AMMONIA  Results from last 7 days   Lab Units 02/04/23  0411 02/04/23  0330   CK TOTAL U/L 1,592* 1,620*             No results found for: HGBA1C, POCGLU  Lab Results   Component Value Date    FREET4 1.2 06/15/2022     No results found for: PREGTESTUR, PREGSERUM, HCG, HCGQUANT  Pain Management Panel     Pain Management Panel Latest Ref Rng & Units 2/3/2023 6/15/2022    AMPHETAMINES SCREEN, URINE Negative Positive(A) -    BARBITURATES SCREEN Negative Negative Negative    BENZODIAZEPINE SCREEN, URINE Negative Negative Negative    BUPRENORPHINEUR Negative Negative -    COCAINE SCREEN, URINE Negative  Negative Negative    FENTANYL URINE QT Negative - Negative    METHADONE SCREEN, URINE Negative Negative Negative    METHAMPHETAMINEUR Negative Positive(A) -        Brief Urine Lab Results  (Last result in the past 365 days)      Color   Clarity   Blood   Leuk Est   Nitrite   Protein   CREAT   Urine HCG        02/03/23 1809 Yellow   Clear   Negative   Negative   Negative   Negative               No results found for: BLOODCX  No results found for: URINECX  No results found for: WOUNDCX  No results found for: STOOLCX  No results found for: RESPCX  No results found for: AFBCX  Results from last 7 days   Lab Units 02/04/23  0330 02/03/23  1822   PROCALCITONIN ng/mL  --  0.16   LACTATE mmol/L 0.8  --    CRP mg/dL  --  0.47       I have personally looked at the labs and they are summarized above.  ----------------------------------------------------------------------------------------------------------------------  Detailed radiology reports for the last 24 hours:  Imaging Results (Last 24 Hours)     ** No results found for the last 24 hours. **        Assessment & Plan    #Bipolar 1 disorder  #Psychosis, agitation, and now sedation  - Suspect methamphetamine abuse  - Patient has been sleeping most of today, only getting up to use the bathroom per nursing staff. Continue monitoring. Trazodone discontinued due to prolonged Qtc. Continue other medications per Psychiatry discretion.    #Hypokalemia  - Replace and recheck per protocol. Repeat BMP in AM.    #Elevated CK  - CK elevated to 1,620, then decreased to 1,590 on repeat. He refused IV fluids today and per nursing staff ED and psych nursing staff were unable to obtain IV access earlier anyway. I discussed with nursing staff to push PO fluids when patient is awake. Repeat labs and attempt IV fluids again if still indicated.  - Not in acute renal failure    #Prolonged Qtc  - Qtc 504 on EKG last night  - Replace electrolytes  - Repeat EKG today to monitor (it appears EKG  was ordered to repeat but patient has refused)    #Hep C positive  - Supportive care, recommend outpatient follow up with GI  - Will check viral load    #Low bicarb  - lactate was WNL last night  - Repeat bmp in am    #Mild AST elevation  - Possibly due to HCV infection.   - Repeat CMP in AM    Edited by: Tamy Ghotra DO at 2/4/2023 1912    VTE Prophylaxis:   Mechanical Order History:     None      Pharmalogical Order History:     None          Tamy Ghotra DO  [facility] Hospitalist  02/04/23  19:13 EST

## 2023-02-05 NOTE — PROGRESS NOTES
[facility] HOSPITALIST PROGRESS NOTE     Patient Identification:  Name:  Hebert Pedraza  Age:  32 y.o.  Sex:  male  :  1990  MRN:  [id number]  Visit Number:  [id number]  ROOM: [address] [place] [address]     Primary Care Provider:  Nestor Milton MD    Length of stay in inpatient status:  2    Subjective     Chief Compliant:  Agitation and sedation    History of Presenting Illness:    Patient seen and examined with nursing staff present. Per their report he has been more cooperative than yesterday, and has been eating and drinking well. No acute events noted overnight. No needs voiced at this time. Patient opens eyes and acknowledges me when I entered the room but would not answer questions.    Objective     Current Hospital Meds:nicotine, 1 patch, Transdermal, Q24H  risperiDONE, 1 mg, Oral, Nightly         Current Antimicrobial Therapy:  Anti-Infectives (From admission, onward)    None        Current Diuretic Therapy:  Diuretics (From admission, onward)    None        ----------------------------------------------------------------------------------------------------------------------  Vital Signs:  Temp:  [96.9 °F (36.1 °C)-97.6 °F (36.4 °C)] 97.6 °F (36.4 °C)  Heart Rate:  [91-97] 91  Resp:  [18] 18  BP: (107-120)/(70-85) 120/85  SpO2:  [99 %] 99 %  on   ;   Device (Oxygen Therapy): room air  Body mass index is 21.43 kg/m².    Wt Readings from Last 3 Encounters:   23 65.8 kg (145 lb 1.6 oz)   23 65.8 kg (145 lb)   10/29/21 72.6 kg (160 lb)     Intake & Output (last 3 days)     None        Diet: Regular/House Diet; Texture: Regular Texture (IDDSI 7); Fluid Consistency: Thin (IDDSI 0)  ----------------------------------------------------------------------------------------------------------------------  Physical exam:   Constitutional:  Well-developed and well-nourished.  No acute distress.      HENT:  Head:  Normocephalic and atraumatic.    Pulmonary/Chest:  Normal rate and effort, clear to  auscultation in posterior lung fields bilaterally  Musculoskeletal:  No deformity.  No red or swollen joints anywhere.    Neurological:  Lying in bed, opens eyes to verbal stimuli but is not cooperative with answering questions or participating in physical exam.   Skin:  Skin is warm and dry.   Peripheral vascular:  No cyanosis, no edema.  Psychiatric: not cooperative  Edited by: Tamy Ghotra DO at 2/5/2023 1523  ----------------------------------------------------------------------------------------------------------------------  Results from last 7 days   Lab Units 02/05/23  0547 02/04/23  0330 02/03/23  1822   CRP mg/dL  --   --  0.47   LACTATE mmol/L  --  0.8  --    WBC 10*3/mm3 7.06 8.75 8.04   HEMOGLOBIN g/dL 13.9 13.0 13.4   HEMATOCRIT % 42.1 40.2 40.6   MCV fL 90.3 92.4 88.8   MCHC g/dL 33.0 32.3 33.0   PLATELETS 10*3/mm3 327 292 354         Results from last 7 days   Lab Units 02/05/23  0547 02/04/23  1938 02/04/23  0330 02/03/23  1822   SODIUM mmol/L 139 146* 139 136   POTASSIUM mmol/L 4.6 3.7 3.6 3.1*   MAGNESIUM mg/dL  --   --   --  2.3   CHLORIDE mmol/L 104 109* 106 99   CO2 mmol/L 25.7 24.8 20.6* 18.3*   BUN mg/dL 27* 20 22* 18   CREATININE mg/dL 1.12 1.24 1.33* 1.24   CALCIUM mg/dL 9.2 9.2 9.2 9.7   GLUCOSE mg/dL 96 93 96 145*   ALBUMIN g/dL 3.8  --  3.8 4.5   BILIRUBIN mg/dL 0.4  --  0.5 0.7   ALK PHOS U/L 76  --  75 81   AST (SGOT) U/L 27  --  49* 44*   ALT (SGPT) U/L 23  --  29 33   Estimated Creatinine Clearance: 88.1 mL/min (by C-G formula based on SCr of 1.12 mg/dL).  No results found for: AMMONIA  Results from last 7 days   Lab Units 02/05/23  0547 02/04/23  1938 02/04/23  0411   CK TOTAL U/L 831* 1,041* 1,592*             No results found for: HGBA1C, POCGLU  Lab Results   Component Value Date    FREET4 1.2 06/15/2022     No results found for: PREGTESTUR, PREGSERUM, HCG, HCGQUANT  Pain Management Panel     Pain Management Panel Latest Ref Rng & Units 2/3/2023 6/15/2022    AMPHETAMINES  SCREEN, URINE Negative Positive(A) -    BARBITURATES SCREEN Negative Negative Negative    BENZODIAZEPINE SCREEN, URINE Negative Negative Negative    BUPRENORPHINEUR Negative Negative -    COCAINE SCREEN, URINE Negative Negative Negative    FENTANYL URINE QT Negative - Negative    METHADONE SCREEN, URINE Negative Negative Negative    METHAMPHETAMINEUR Negative Positive(A) -        Brief Urine Lab Results  (Last result in the past 365 days)      Color   Clarity   Blood   Leuk Est   Nitrite   Protein   CREAT   Urine HCG        02/03/23 1809 Yellow   Clear   Negative   Negative   Negative   Negative               No results found for: BLOODCX  No results found for: URINECX  No results found for: WOUNDCX  No results found for: STOOLCX  No results found for: RESPCX  No results found for: AFBCX  Results from last 7 days   Lab Units 02/04/23  0330 02/03/23  1822   PROCALCITONIN ng/mL  --  0.16   LACTATE mmol/L 0.8  --    CRP mg/dL  --  0.47       I have personally looked at the labs and they are summarized above.  ----------------------------------------------------------------------------------------------------------------------  Detailed radiology reports for the last 24 hours:  Imaging Results (Last 24 Hours)     ** No results found for the last 24 hours. **        Assessment & Plan    #Bipolar 1 disorder  #Psychosis, agitation, and now sedation  - Suspect methamphetamine abuse  - Patient still sleeping a lot, but more interactive and cooperative per nursing staff. Continue monitoring. Trazodone, zofran, hydroxyzine discontinued due to prolonged Qtc. Continue other medications per Psychiatry discretion.    #Hypokalemia  - Replace and recheck per protocol. Potassium now WNL after supplementation. Recommend periodic monitoring.    #Elevated CK  - CK elevated to 1,620, then decreased to 1,590 on repeat yesterday. Repeat CK has now trended down to 831 today. Recommend continued trend until resolution. He refused IV fluids  yesterday. Continue to encourage PO fluids.  - Not in acute renal failure. Recommend to continue periodic monitoring.    #Prolonged Qtc  - Qtc 504 on EKG initially, but has improved to 433 on repeat EKG.  - Replace electrolytes as indicated    #Hep C positive  - Supportive care, recommend outpatient follow up with GI  - Labs ordered to check viral load so that GI can use after his discharge.    #Low bicarb  - lactate was WNL   - Now resolved on repeat labs    #Mild AST elevation  - Possibly due to HCV infection.   - Resolved on repeat labs    We will sign off. Please do not hesitate to call if we can be of assistance.    Edited by: Tamy Ghotra DO at 2/5/2023 1523    VTE Prophylaxis:   Mechanical Order History:     None      Pharmalogical Order History:     None          Tamy Ghotra DO  [facility] Hospitalist  02/05/23  15:23 EST

## 2023-02-06 LAB — CK SERPL-CCNC: 538 U/L (ref 20–200)

## 2023-02-06 PROCEDURE — 82550 ASSAY OF CK (CPK): CPT | Performed by: PSYCHIATRY & NEUROLOGY

## 2023-02-06 PROCEDURE — 99232 SBSQ HOSP IP/OBS MODERATE 35: CPT | Performed by: PSYCHIATRY & NEUROLOGY

## 2023-02-06 RX ADMIN — Medication 1 PATCH: at 09:09

## 2023-02-06 NOTE — PLAN OF CARE
Goal Outcome Evaluation:  Plan of Care Reviewed With: patient  Patient Agreement with Plan of Care: agrees     Progress: improving  Outcome Evaluation: Pt stated he was eating and sleeping good. Pt had no anxiety or depression and no si/hi or avh. Pt was not oriented to date and place. PT did shower this shift. PT was calm and cooperative and had no issues or concerns this shift.

## 2023-02-06 NOTE — PROGRESS NOTES
" Inpatient Psych Progress Note     Clinician: Javier Sears MD  Admission Date: 2/3/2023  08:58 EST 02/06/23    Behavioral Health Treatment Plan and Problem List: I have reviewed and approved the Behavioral Health Treatment Plan and Problem list.    Allergies  No Known Allergies    Hospital Day: 3 days      Assessment completed within view of staff    History  CC/clinical focus: psychosis    Interval HPI: Patient seen and evaluated by me.  Chart reviewed. Feeling better today. Interacts during the interview today.  He explains that he thinks he got into a bad batch of methamphetamine and it was making him feel paranoid.  He is not paranoid today.  Denies AVH.    CPK trending down.  Med Compliant.  ROS otherwise as below.      Interval Review of Systems:   General ROS: negative for - fever or malaise  Endocrine ROS: negative for - palpitations  Respiratory ROS: no cough, shortness of breath, or wheezing  Cardiovascular ROS: no chest pain or dyspnea on exertion  Gastrointestinal ROS: no abdominal pain,no black or bloody stools    /65 (BP Location: Right arm, Patient Position: Sitting)   Pulse 85   Temp 97.6 °F (36.4 °C) (Temporal)   Resp 18   Ht 175.3 cm (69\")   Wt 65.8 kg (145 lb 1.6 oz)   SpO2 99%   BMI 21.43 kg/m²     Mental Status Exam  Mood: slightly anxious  Affect: mood congruent  Thought Processes: linear  Thought Content: No Delusions voiced  Hallucinations: Denies  Suicidal Thoughts: Denies  Suicidal Plan/Intent: Denies  Hopelesness: no          Medical Decision Making:   Labs:     Lab Results (last 24 hours)     Procedure Component Value Units Date/Time    CK [[id number]]  (Abnormal) Collected: 02/06/23 0421    Specimen: Blood Updated: 02/06/23 0506     Creatine Kinase 538 U/L             Radiology:     Imaging Results (Last 24 Hours)     ** No results found for the last 24 hours. **            EKG:     ECG/EMG Results (most recent)     Procedure Component Value Units Date/Time    ECG 12 Lead " Other [[id number]] Collected: 02/04/23 0101     Updated: 02/04/23 1019     QT Interval 360 ms      QTC Interval 504 ms     Narrative:      Test Reason : Baseline Cardiac Status~  Blood Pressure :   */*   mmHG  Vent. Rate : 118 BPM     Atrial Rate : 118 BPM     P-R Int : 118 ms          QRS Dur :  94 ms      QT Int : 360 ms       P-R-T Axes :  76  81  66 degrees     QTc Int : 504 ms    Sinus tachycardia  Possible Left atrial enlargement  Borderline ECG  No previous ECGs available  Confirmed by Luba Olmstead ([id number]) on 2/4/2023 10:19:05 AM    Referred By:            Confirmed By: Luba Olmstead    ECG 12 Lead QT Measurement [[id number]] Collected: 02/05/23 1031     Updated: 02/05/23 1803     QT Interval 420 ms      QTC Interval 433 ms     Narrative:      Test Reason : QT Measurement  Blood Pressure :   */*   mmHG  Vent. Rate :  64 BPM     Atrial Rate :  64 BPM     P-R Int : 130 ms          QRS Dur :  94 ms      QT Int : 420 ms       P-R-T Axes :  67  76  68 degrees     QTc Int : 433 ms    Normal sinus rhythm with sinus arrhythmia  Normal ECG  When compared with ECG of 04-FEB-2023 01:01,  Vent. rate has decreased BY  54 BPM  Nonspecific T wave abnormality no longer evident in Inferior leads  T wave inversion no longer evident in Anterior leads  Confirmed by Luba Olmstead ([id number]) on 2/5/2023 5:55:01 PM    Referred By: MINOO           Confirmed By: Luba Olmstead           Medications:  nicotine, 1 patch, Transdermal, Q24H  risperiDONE, 1 mg, Oral, Nightly           All medications reviewed.      Assessment and Plan:    Psychosis (HCC)  h/o Dx of Bipolar I Disorder  - Screening questions do not reveal a h/o of true shanae or hypomania.  - Psychosis was likely substance induced  - Will continue Risperdal 1mg QHS but he may not need to take this long term    Methamphetamine Use Disorder, Severe  - CD Counseling  - He is interested in rehab, will assist with dispo options      Prolonged QTc  - QTc 504 at 101AM on 2/4  - Repeat EKG  on 2/5/23 shows improved QTc of 433.  - Holding trazodone, zofran, hydroxyzine        Hep C Positive  - Counseled patient RE: the positive Hep C test.    - Advised patient to followup with PCP and GI as an outpatient.  He voiced an understanding and agreed to do so.      Continue hospitalization for safety and stabilization.  Continue current level of Special Precautions (q15 minute checks).

## 2023-02-06 NOTE — PLAN OF CARE
Problem: Adult Behavioral Health Plan of Care  Goal: Plan of Care Review  Outcome: Ongoing, Progressing  Flowsheets  Taken 2/6/2023 1324 by Mitchell Cruz, RN  Outcome Evaluation: PATIENT DENIES ANY PROBLEMS THIS SHIFT. PATIENT IS AOX3, COOPERATIVE WITH NO S/S OF ACUTE DISTRESS NOTED. PATIENT LAST CK LEVEL , TRENDING DOWN. HALDOL WAS D/C. PATIENT REMAINS ON A 72 HOUR HOLD.  Taken 2/6/2023 0924 by Mitchell Cruz, RN  Plan of Care Reviewed With: patient  Patient Agreement with Plan of Care: agrees  Taken 2/5/2023 2256 by Aracelis Ambrosio, RN  Progress: improving   Goal Outcome Evaluation:  Plan of Care Reviewed With: patient  Patient Agreement with Plan of Care: agrees        Outcome Evaluation: PATIENT DENIES ANY PROBLEMS THIS SHIFT. PATIENT IS AOX3, COOPERATIVE WITH NO S/S OF ACUTE DISTRESS NOTED. PATIENT LAST CK LEVEL , TRENDING DOWN. HALDOL WAS D/C. PATIENT REMAINS ON A 72 HOUR HOLD.

## 2023-02-06 NOTE — PROGRESS NOTES
Navigator is helping Primary Therapist with the following referrals:    [facility] - [phone]  -Spoke with Jan[facility]. She states they will accept patient once he is medically ready for discharge. 2/6

## 2023-02-06 NOTE — PLAN OF CARE
Goal Outcome Evaluation:   Consent Given to Review Plan with: Patient unable to participate due to mental status and requiring IM injections for safety.  Progress: no change  Outcome Evaluation: Therapist unable to meet with patient to review plan of care. Patient unable to participate due to mental status and requiring IM injections for safety.         DATA:      Therapist discussed case with Dr. LOIDA Sears and met with patient today to review coping skills, review plan of care, and discuss discharge.    Therapist obtained consent for Robby Pedraza, patient's mother.      Clinical Maneuvering/Intervention:     Therapist assisted patient in processing above session content; acknowledged and normalized patient’s thoughts, feelings, and concerns.  Discussed the therapist/patient relationship and explain the parameters and limitations of relative confidentiality.  Also discussed the importance of active participation, and honesty to the treatment process.  Encouraged the patient to discuss/vent their feelings, frustrations, and fears concerning their ongoing medical issues and validated their feelings.     Allowed patient to freely discuss issues without interruption or judgment. Provided safe, confidential environment to facilitate the development of positive therapeutic relationship and encourage open, honest communication.      Therapist addressed discharge safety planning this date. Assisted patient in identifying risk factors which would indicate the need for higher level of care after discharge;  including thoughts to harm self or others and/or self-harming behavior. Encouraged patient to call 911, or present to the nearest emergency room should any of these events occur. Discussed crisis intervention services and means to access.  Encouraged securing any objects of harm.    Therapist completed integrated summary, treatment plan, and initiated social history this date.  Therapist is strongly encouraging  "family involvement in treatment.       Encouraged mask wearing, social distancing, and regular hand washing due to COVID19 risk.      ASSESSMENT:      Therapist met 1:1 with patient today. Patient denies suicidal ideation. Patient denies homicidal ideation. Patient denies AVH. Patient is calm and cooperative with assessment. Symptoms of psychosis appear largely resolved. He is a bit unorganized still, answering \"yes\" to questions that are not \"yes\" or \"no\" questions. Patient reports that someone brought him here from [place] but he does not remember who or why. Patient states he doesn't remember much about the weekend, but knows he was seeing people and hearing voices. [facility] [place] [place] He agreed to a referral to [facility], stating he has been there before; however, seemed uncertain when stating this.      PLAN:       Patient to remain hospitalized this date.      Treatment team will focus efforts on stabilizing patient's acute symptoms while providing education on healthy coping and crisis management to reduce hospitalizations.   Patient requires daily psychiatrist evaluation and 24/7 nursing supervision to promote patient  safety.     Therapist will offer 1-4 individual sessions, 1 therapy group daily, family education, and appropriate referral.    "

## 2023-02-07 PROCEDURE — 99232 SBSQ HOSP IP/OBS MODERATE 35: CPT | Performed by: PSYCHIATRY & NEUROLOGY

## 2023-02-07 RX ADMIN — RISPERIDONE 1 MG: 1 TABLET, FILM COATED ORAL at 22:20

## 2023-02-07 NOTE — PLAN OF CARE
Problem: Adult Behavioral Health Plan of Care  Goal: Plan of Care Review  Outcome: Ongoing, Progressing  Flowsheets  Taken 2/7/2023 1414 by Mitchell Cruz, RN  Outcome Evaluation: PATIENT DENIES ANY PROBLEMS THIS SHIFT. PATIENT IS AOX3, COOPERATIVE WITH NO S/S OF ACUTE DISTRESS NOTED. NNO NOTED  Taken 2/7/2023 0914 by Mitchell Cruz, RN  Plan of Care Reviewed With: patient  Patient Agreement with Plan of Care: agrees  Taken 2/5/2023 3986 by Aracelis Ambrosio RN  Progress: improving   Goal Outcome Evaluation:  Plan of Care Reviewed With: patient  Patient Agreement with Plan of Care: agrees        Outcome Evaluation: PATIENT DENIES ANY PROBLEMS THIS SHIFT. PATIENT IS AOX3, COOPERATIVE WITH NO S/S OF ACUTE DISTRESS NOTED. NNO NOTED

## 2023-02-07 NOTE — NURSING NOTE
APPOINTMENT MADE TO SEE DR. MANZO, AFTER DISCHARGE, IN [facility]. APPOINTMENT ON FEB. 15TH AT NOON.

## 2023-02-07 NOTE — PROGRESS NOTES
" Inpatient Psych Progress Note     Clinician: Javier Sears MD  Admission Date: 2/3/2023  08:33 EST 02/07/23    Behavioral Health Treatment Plan and Problem List: I have reviewed and approved the Behavioral Health Treatment Plan and Problem list.    Allergies  No Known Allergies    Hospital Day: 4 days      Assessment completed within view of staff    History  CC/clinical focus: psychosis    Interval HPI: Patient seen and evaluated by me.  Chart reviewed. He continues to demonstrate signs of improvement.  Denies AVH today.  Reality testing intact.  No signs of paranoia.  Tolerating medication okay.  He is interested in rehab.  Med Compliant.  ROS otherwise as below.      Interval Review of Systems:   General ROS: negative for - fever or malaise  Endocrine ROS: negative for - palpitations  Respiratory ROS: no cough, shortness of breath, or wheezing  Cardiovascular ROS: no chest pain or dyspnea on exertion  Gastrointestinal ROS: no abdominal pain,no black or bloody stools    /64 (BP Location: Right arm, Patient Position: Sitting)   Pulse 76   Temp 97.8 °F (36.6 °C) (Temporal)   Resp 18   Ht 175.3 cm (69\")   Wt 65.8 kg (145 lb 1.6 oz)   SpO2 97%   BMI 21.43 kg/m²     Mental Status Exam  Mood: \"okay\"  Affect: mood congruent  Thought Processes: linear  Thought Content: No Delusions voiced  Hallucinations: Denies  Suicidal Thoughts: Denies  Suicidal Plan/Intent: Denies  Hopelesness: no          Medical Decision Making:   Labs:     Lab Results (last 24 hours)     ** No results found for the last 24 hours. **            Radiology:     Imaging Results (Last 24 Hours)     ** No results found for the last 24 hours. **            EKG:     ECG/EMG Results (most recent)     Procedure Component Value Units Date/Time    ECG 12 Lead Other [[id number]] Collected: 02/04/23 0101     Updated: 02/04/23 1019     QT Interval 360 ms      QTC Interval 504 ms     Narrative:      Test Reason : Baseline Cardiac Status~  Blood " Pressure :   */*   mmHG  Vent. Rate : 118 BPM     Atrial Rate : 118 BPM     P-R Int : 118 ms          QRS Dur :  94 ms      QT Int : 360 ms       P-R-T Axes :  76  81  66 degrees     QTc Int : 504 ms    Sinus tachycardia  Possible Left atrial enlargement  Borderline ECG  No previous ECGs available  Confirmed by Luba Olmstead ([id number]) on 2/4/2023 10:19:05 AM    Referred By:            Confirmed By: Luba Olmstead    ECG 12 Lead QT Measurement [[id number]] Collected: 02/05/23 1031     Updated: 02/05/23 1803     QT Interval 420 ms      QTC Interval 433 ms     Narrative:      Test Reason : QT Measurement  Blood Pressure :   */*   mmHG  Vent. Rate :  64 BPM     Atrial Rate :  64 BPM     P-R Int : 130 ms          QRS Dur :  94 ms      QT Int : 420 ms       P-R-T Axes :  67  76  68 degrees     QTc Int : 433 ms    Normal sinus rhythm with sinus arrhythmia  Normal ECG  When compared with ECG of 04-FEB-2023 01:01,  Vent. rate has decreased BY  54 BPM  Nonspecific T wave abnormality no longer evident in Inferior leads  T wave inversion no longer evident in Anterior leads  Confirmed by Luba Olmstead ([id number]) on 2/5/2023 5:55:01 PM    Referred By: MINOO           Confirmed By: Luba Olmstead           Medications:  nicotine, 1 patch, Transdermal, Q24H  risperiDONE, 1 mg, Oral, Nightly           All medications reviewed.      Assessment and Plan:    Psychosis (HCC)  h/o Dx of Bipolar I Disorder  - Screening questions do not reveal a h/o of true shanae or hypomania.  - Psychosis was likely substance induced  - Will continue Risperdal 1mg QHS but he may not need to take this long term  - We will work on getting rehab lined up, then once arranged will discharge     Methamphetamine Use Disorder, Severe  - CD Counseling  - He is interested in rehab, will assist with dispo options        Prolonged QTc  - QTc 504 at 101AM on 2/4  - Repeat EKG on 2/5/23 shows improved QTc of 433.  - Holding trazodone, zofran, hydroxyzine        Hep C Positive  -  Counseled patient RE: the positive Hep C test.    - Advised patient to followup with PCP and GI as an outpatient.  He voiced an understanding and agreed to do so.         Continue hospitalization for safety and stabilization.  Continue current level of Special Precautions (q15 minute checks).

## 2023-02-07 NOTE — PLAN OF CARE
DATA:      Therapist discussed case with RN and met with patient today to review coping skills, review plan of care, and discuss discharge.    Therapist was accepted by [facility] and can admit at discharge for residential treatment for substance use.      Clinical Maneuvering/Intervention:     Therapist assisted patient in processing above session content; acknowledged and normalized patient’s thoughts, feelings, and concerns.  Discussed the therapist/patient relationship and explain the parameters and limitations of relative confidentiality.  Also discussed the importance of active participation, and honesty to the treatment process.  Encouraged the patient to discuss/vent their feelings, frustrations, and fears concerning their ongoing medical issues and validated their feelings.     Allowed patient to freely discuss issues without interruption or judgment. Provided safe, confidential environment to facilitate the development of positive therapeutic relationship and encourage open, honest communication.      Therapist addressed discharge safety planning this date. Assisted patient in identifying risk factors which would indicate the need for higher level of care after discharge;  including thoughts to harm self or others and/or self-harming behavior. Encouraged patient to call 911, or present to the nearest emergency room should any of these events occur. Discussed crisis intervention services and means to access.  Encouraged securing any objects of harm.    Therapist completed integrated summary, treatment plan, and initiated social history this date.  Therapist is strongly encouraging family involvement in treatment.       Encouraged mask wearing, social distancing, and regular hand washing due to COVID19 risk.      ASSESSMENT:      Therapist met 1:1 with patient today. Patient was calm and cooperative today. He reports improvement in mood and symptoms. He denies SI/HI/AVH. Patient was engaged in conversation  "today. Patient reports he relapsed recently due to \"life stressors.\" Patient reports this resulted in his symptoms of psychosis. He appears motivated for treatment and recovery. Still answering some questions inappropriately, but better than yesterday.      PLAN:       Patient to remain hospitalized this date.      Treatment team will focus efforts on stabilizing patient's acute symptoms while providing education on healthy coping and crisis management to reduce hospitalizations.   Patient requires daily psychiatrist evaluation and 24/7 nursing supervision to promote patient  safety.     Therapist will offer 1-4 individual sessions, 1 therapy group daily, family education, and appropriate referral.    "

## 2023-02-08 VITALS
HEIGHT: 69 IN | DIASTOLIC BLOOD PRESSURE: 75 MMHG | SYSTOLIC BLOOD PRESSURE: 111 MMHG | HEART RATE: 79 BPM | TEMPERATURE: 97.5 F | WEIGHT: 145.1 LBS | OXYGEN SATURATION: 96 % | BODY MASS INDEX: 21.49 KG/M2 | RESPIRATION RATE: 18 BRPM

## 2023-02-08 PROBLEM — F29 PSYCHOSIS (HCC): Status: ACTIVE | Noted: 2023-02-08

## 2023-02-08 LAB
HCV RNA SERPL NAA+PROBE-ACNC: NORMAL IU/ML
TEST INFORMATION: NORMAL

## 2023-02-08 PROCEDURE — 99238 HOSP IP/OBS DSCHRG MGMT 30/<: CPT | Performed by: PSYCHIATRY & NEUROLOGY

## 2023-02-08 RX ORDER — RISPERIDONE 1 MG/1
1 TABLET ORAL NIGHTLY
Qty: 30 TABLET | Refills: 0 | Status: SHIPPED | OUTPATIENT
Start: 2023-02-08

## 2023-02-08 NOTE — PROGRESS NOTES
" Inpatient Psych Progress Note     Clinician: Javier Sears MD  Admission Date: 2/3/2023  08:32 EST 02/08/23    Behavioral Health Treatment Plan and Problem List: I have reviewed and approved the Behavioral Health Treatment Plan and Problem list.    Allergies  No Known Allergies    Hospital Day: 5 days      Assessment completed within view of staff    History  CC/clinical focus: psychosis    Interval HPI: Patient seen and evaluated by me.  Chart reviewed. No signs of paranoia. Not experiencing any AH.  Reality testing intact. Planning to go to rehab.   Med Compliant.  ROS otherwise as below.      Interval Review of Systems:   General ROS: negative for - fever or malaise  Endocrine ROS: negative for - palpitations  Respiratory ROS: no cough, shortness of breath, or wheezing  Cardiovascular ROS: no chest pain or dyspnea on exertion  Gastrointestinal ROS: no abdominal pain,no black or bloody stools    /75 (BP Location: Right arm, Patient Position: Sitting)   Pulse 79   Temp 97.5 °F (36.4 °C) (Temporal)   Resp 18   Ht 175.3 cm (69\")   Wt 65.8 kg (145 lb 1.6 oz)   SpO2 96%   BMI 21.43 kg/m²     Mental Status Exam  Mood: \"okay\"  Affect: mood congruent  Thought Processes: linear  Thought Content: No Delusions voiced  Hallucinations: Denies  Suicidal Thoughts: Denies  Suicidal Plan/Intent: Denies  Hopelesness:none          Medical Decision Making:   Labs:     Lab Results (last 24 hours)     ** No results found for the last 24 hours. **            Radiology:     Imaging Results (Last 24 Hours)     ** No results found for the last 24 hours. **            EKG:     ECG/EMG Results (most recent)     Procedure Component Value Units Date/Time    ECG 12 Lead Other [[id number]] Collected: 02/04/23 0101     Updated: 02/04/23 1019     QT Interval 360 ms      QTC Interval 504 ms     Narrative:      Test Reason : Baseline Cardiac Status~  Blood Pressure :   */*   mmHG  Vent. Rate : 118 BPM     Atrial Rate : 118 BPM     P-R " Int : 118 ms          QRS Dur :  94 ms      QT Int : 360 ms       P-R-T Axes :  76  81  66 degrees     QTc Int : 504 ms    Sinus tachycardia  Possible Left atrial enlargement  Borderline ECG  No previous ECGs available  Confirmed by Luba Olmstead ([id number]) on 2/4/2023 10:19:05 AM    Referred By:            Confirmed By: Luba Olmstead    ECG 12 Lead QT Measurement [[id number]] Collected: 02/05/23 1031     Updated: 02/05/23 1803     QT Interval 420 ms      QTC Interval 433 ms     Narrative:      Test Reason : QT Measurement  Blood Pressure :   */*   mmHG  Vent. Rate :  64 BPM     Atrial Rate :  64 BPM     P-R Int : 130 ms          QRS Dur :  94 ms      QT Int : 420 ms       P-R-T Axes :  67  76  68 degrees     QTc Int : 433 ms    Normal sinus rhythm with sinus arrhythmia  Normal ECG  When compared with ECG of 04-FEB-2023 01:01,  Vent. rate has decreased BY  54 BPM  Nonspecific T wave abnormality no longer evident in Inferior leads  T wave inversion no longer evident in Anterior leads  Confirmed by Luba Olmstead ([id number]) on 2/5/2023 5:55:01 PM    Referred By: MINOO           Confirmed By: Luba Olmstead           Medications:  nicotine, 1 patch, Transdermal, Q24H  risperiDONE, 1 mg, Oral, Nightly           All medications reviewed.      Assessment and Plan:      Psychosis (HCC)  h/o Dx of Bipolar I Disorder  - Screening questions do not reveal a h/o of true shanae or hypomania.  - Psychosis was likely substance induced  - Will continue Risperdal 1mg QHS but he may not need to take this long term  - Discharge to Rehab     Methamphetamine Use Disorder, Severe  - CD Counseling  -Discharge to rehab        Prolonged QTc  - QTc 504 at 101AM on 2/4  - Repeat EKG on 2/5/23 shows improved QTc of 433.  - Holding trazodone, zofran, hydroxyzine        Hep C Positive  - Counseled patient RE: the positive Hep C test.    - Advised patient to followup with PCP and GI as an outpatient.  He voiced an understanding and agreed to do  so.

## 2023-02-08 NOTE — PROGRESS NOTES
1124: Spoke with staff. They will try to find  and pick patient up later today.     1032: Attempted call again. No answer.     1000: Attempted to reach [facility]. No answer. Mailbox is full and they can not accept messages.     925: Navigator contacted Janessa with [facility]. She states to call back in 30 minutes and see if they had transportation available.     [facility] - [phone]

## 2023-02-08 NOTE — PLAN OF CARE
Goal Outcome Evaluation:  Plan of Care Reviewed With: patient  Patient Agreement with Plan of Care: agrees     Progress: improving  Outcome Evaluation: Pt stated his appetite was good but he was still having trouble sleeping thru the night. Pt stated he had no anxiety or depression. Pt had no si/hi or avh. Pt was up and down thru out the shift but had no other issues or concerns.

## 2023-02-08 NOTE — PLAN OF CARE
DATA:      Therapist discussed case with RN and met with patient today to review coping skills, review plan of care, and discuss discharge.     Clinical Maneuvering/Intervention:     Therapist assisted patient in processing above session content; acknowledged and normalized patient’s thoughts, feelings, and concerns.  Discussed the therapist/patient relationship and explain the parameters and limitations of relative confidentiality.  Also discussed the importance of active participation, and honesty to the treatment process.  Encouraged the patient to discuss/vent their feelings, frustrations, and fears concerning their ongoing medical issues and validated their feelings.     Allowed patient to freely discuss issues without interruption or judgment. Provided safe, confidential environment to facilitate the development of positive therapeutic relationship and encourage open, honest communication.      Therapist addressed discharge safety planning this date. Assisted patient in identifying risk factors which would indicate the need for higher level of care after discharge;  including thoughts to harm self or others and/or self-harming behavior. Encouraged patient to call 911, or present to the nearest emergency room should any of these events occur. Discussed crisis intervention services and means to access.  Encouraged securing any objects of harm.    Therapist completed integrated summary, treatment plan, and initiated social history this date.  Therapist is strongly encouraging family involvement in treatment.       Encouraged mask wearing, social distancing, and regular hand washing due to COVID19 risk.      ASSESSMENT:      Therapist met 1:1 with patient today. Patient denies suicidal ideation. Patient denies homicidal ideation. Patient denies AVH. Patient is calm and cooperative. He reports feeling ready to return to sober living today. Patient denies any major concerns. He is future oriented. Therapist  advised patient to go to the nearest ED/contact 911 if he experiences SI/HI/AVH, patient agreeable.      PLAN:       Patient to discharge to [facility] today.

## 2023-02-08 NOTE — DISCHARGE SUMMARY
Date of Discharge:  2/8/2023    Discharge Diagnosis:  Psychosis  Methamphetamine Use Disorder, Severe  Rule out Bipolar I Disorder       Hospital Course:  Patient was admitted for safety and stabilization.   Routine labs were checked.  Patient was assigned a master's level therapist and provided with an opportunity to participate in group and individual therapy and counseling on the unit.  On the first night of admission patient developed some significant agitation and received IM Haldol, Ativan and Thorazine.  Nursing staff felt patient was overly sedated and the hospitalist was consulted.  See consult note for details.  Patient ultimately improved over the following 24 hours.  He did not show symptoms of neuroleptic malignant syndrome.  Patient's mental status improved over the initial 36 hours of the hospitalization and by day hospital day 2 he was fully alert and oriented and interacting with the nursing staff and the psychiatrist during the evaluations.  Patient was started on Risperdal 1 mg nightly.  It was felt that patient's presenting symptoms were due largely to substance intoxication and that he may not need to continue on the Risperdal long-term.  He was noted to have a prolonged QTC on admission at 504 but that improved on subsequent EKGs and he had a QTC of 433 on February 5th.  A test for hepatitis C came back positive.  Patient was counseled regarding this and advised to follow-up with primary care physician and eventually GI as an outpatient.  Patient voiced an understanding and agreed to do so.  Arrangements were made for patient to be discharged to rehab and he was discharged in stable condition.        Consults:   Consults     Date and Time Order Name Status Description    2/4/2023  3:37 AM Inpatient Hospitalist Consult            Labs:  Lab Results (all)     Procedure Component Value Units Date/Time    CK [[id number]]  (Abnormal) Collected: 02/06/23 0421    Specimen: Blood Updated: 02/06/23  0506     Creatine Kinase 538 U/L     Comprehensive Metabolic Panel [[id number]]  (Abnormal) Collected: 02/05/23 0547    Specimen: Blood Updated: 02/05/23 0637     Glucose 96 mg/dL      BUN 27 mg/dL      Creatinine 1.12 mg/dL      Sodium 139 mmol/L      Potassium 4.6 mmol/L      Chloride 104 mmol/L      CO2 25.7 mmol/L      Calcium 9.2 mg/dL      Total Protein 6.8 g/dL      Albumin 3.8 g/dL      ALT (SGPT) 23 U/L      AST (SGOT) 27 U/L      Alkaline Phosphatase 76 U/L      Total Bilirubin 0.4 mg/dL      Globulin 3.0 gm/dL      A/G Ratio 1.3 g/dL      BUN/Creatinine Ratio 24.1     Anion Gap 9.3 mmol/L      eGFR 89.5 mL/min/1.73     Narrative:      GFR Normal >60  Chronic Kidney Disease <60  Kidney Failure <15      CK [[id number]]  (Abnormal) Collected: 02/05/23 0547    Specimen: Blood Updated: 02/05/23 0637     Creatine Kinase 831 U/L     CBC (No Diff) [[id number]]  (Normal) Collected: 02/05/23 0547    Specimen: Blood Updated: 02/05/23 0617     WBC 7.06 10*3/mm3      RBC 4.66 10*6/mm3      Hemoglobin 13.9 g/dL      Hematocrit 42.1 %      MCV 90.3 fL      MCH 29.8 pg      MCHC 33.0 g/dL      RDW 13.5 %      RDW-SD 44.1 fl      MPV 9.3 fL      Platelets 327 10*3/mm3     CK [[id number]]  (Abnormal) Collected: 02/04/23 1938    Specimen: Blood Updated: 02/04/23 2011     Creatine Kinase 1,041 U/L     Basic Metabolic Panel [[id number]]  (Abnormal) Collected: 02/04/23 1938    Specimen: Blood Updated: 02/04/23 2011     Glucose 93 mg/dL      BUN 20 mg/dL      Creatinine 1.24 mg/dL      Sodium 146 mmol/L      Potassium 3.7 mmol/L      Chloride 109 mmol/L      CO2 24.8 mmol/L      Calcium 9.2 mg/dL      BUN/Creatinine Ratio 16.1     Anion Gap 12.2 mmol/L      eGFR 79.2 mL/min/1.73     Narrative:      GFR Normal >60  Chronic Kidney Disease <60  Kidney Failure <15      CK [[id number]]  (Abnormal) Collected: 02/04/23 0411    Specimen: Blood Updated: 02/04/23 1435     Creatine Kinase 1,592 U/L     CK [[id number]]  (Abnormal) Collected:  "02/04/23 0330    Specimen: Blood Updated: 02/04/23 0755     Creatine Kinase 1,620 U/L     Hepatitis C RNA, Quantitative, PCR (graph) [[id number]] Collected: 02/04/23 0717    Specimen: Blood Updated: 02/04/23 0738    Hepatitis Panel, Acute [[id number]]  (Abnormal) Collected: 02/04/23 0411    Specimen: Blood Updated: 02/04/23 0556     Hepatitis B Surface Ag Non-Reactive     Hep A IgM Non-Reactive     Hep B C IgM Non-Reactive     Hepatitis C Ab Reactive    Narrative:      Results may be falsely decreased if patient taking Biotin.     Procalcitonin [[id number]]  (Normal) Collected: 02/03/23 1822    Specimen: Blood from Arm, Right Updated: 02/04/23 0511     Procalcitonin 0.16 ng/mL     Narrative:      As a Marker for Sepsis (Non-Neonates):    1. <0.5 ng/mL represents a low risk of severe sepsis and/or septic shock.  2. >2 ng/mL represents a high risk of severe sepsis and/or septic shock.    As a Marker for Lower Respiratory Tract Infections that require antibiotic therapy:    PCT on Admission    Antibiotic Therapy       6-12 Hrs later    >0.5                Strongly Recommended  >0.25 - <0.5        Recommended   0.1 - 0.25          Discouraged              Remeasure/reassess PCT  <0.1                Strongly Discouraged     Remeasure/reassess PCT    As 28 day mortality risk marker: \"Change in Procalcitonin Result\" (>80% or <=80%) if Day 0 (or Day 1) and Day 4 values are available. Refer to http://www.[facility]-pct-calculator.com    Change in PCT <=80%  A decrease of PCT levels below or equal to 80% defines a positive change in PCT test result representing a higher risk for 28-day all-cause mortality of patients diagnosed with severe sepsis for septic shock.    Change in PCT >80%  A decrease of PCT levels of more than 80% defines a negative change in PCT result representing a lower risk for 28-day all-cause mortality of patients diagnosed with severe sepsis or septic shock.       Comprehensive Metabolic Panel [[id number]]  " (Abnormal) Collected: 02/04/23 0330    Specimen: Blood Updated: 02/04/23 0405     Glucose 96 mg/dL      BUN 22 mg/dL      Creatinine 1.33 mg/dL      Sodium 139 mmol/L      Potassium 3.6 mmol/L      Comment: Slight hemolysis detected by analyzer. Results may be affected.        Chloride 106 mmol/L      CO2 20.6 mmol/L      Calcium 9.2 mg/dL      Total Protein 6.8 g/dL      Albumin 3.8 g/dL      ALT (SGPT) 29 U/L      AST (SGOT) 49 U/L      Alkaline Phosphatase 75 U/L      Total Bilirubin 0.5 mg/dL      Globulin 3.0 gm/dL      A/G Ratio 1.3 g/dL      BUN/Creatinine Ratio 16.5     Anion Gap 12.4 mmol/L      eGFR 72.8 mL/min/1.73     Narrative:      GFR Normal >60  Chronic Kidney Disease <60  Kidney Failure <15      Lactic Acid, Plasma [[id number]]  (Normal) Collected: 02/04/23 0330    Specimen: Blood Updated: 02/04/23 0401     Lactate 0.8 mmol/L     CBC & Differential [[id number]]  (Abnormal) Collected: 02/04/23 0330    Specimen: Blood Updated: 02/04/23 0344    Narrative:      The following orders were created for panel order CBC & Differential.  Procedure                               Abnormality         Status                     ---------                               -----------         ------                     CBC Auto Differential[[id number]]        Abnormal            Final result                 Please view results for these tests on the individual orders.    CBC Auto Differential [[id number]]  (Abnormal) Collected: 02/04/23 0330    Specimen: Blood Updated: 02/04/23 0344     WBC 8.75 10*3/mm3      RBC 4.35 10*6/mm3      Hemoglobin 13.0 g/dL      Hematocrit 40.2 %      MCV 92.4 fL      MCH 29.9 pg      MCHC 32.3 g/dL      RDW 13.3 %      RDW-SD 45.3 fl      MPV 9.0 fL      Platelets 292 10*3/mm3      Neutrophil % 61.2 %      Lymphocyte % 25.3 %      Monocyte % 12.1 %      Eosinophil % 0.7 %      Basophil % 0.5 %      Immature Grans % 0.2 %      Neutrophils, Absolute 5.36 10*3/mm3      Lymphocytes, Absolute 2.21  10*3/mm3      Monocytes, Absolute 1.06 10*3/mm3      Eosinophils, Absolute 0.06 10*3/mm3      Basophils, Absolute 0.04 10*3/mm3      Immature Grans, Absolute 0.02 10*3/mm3      nRBC 0.0 /100 WBC     C-reactive Protein [[id number]]  (Normal) Collected: 02/03/23 1822    Specimen: Blood from Arm, Right Updated: 02/04/23 0326     C-Reactive Protein 0.47 mg/dL           Imaging:  Imaging Results (All)     None            Condition on Discharge:  Stable    Vital Signs  Temp:  [97.5 °F (36.4 °C)-99.2 °F (37.3 °C)] 97.5 °F (36.4 °C)  Heart Rate:  [68-79] 79  Resp:  [18] 18  BP: (111-115)/(75-82) 111/75    Discharge Disposition  Home or Self Care    Discharge Medications     Discharge Medications      New Medications      Instructions Start Date   risperiDONE 1 MG tablet  Commonly known as: risperDAL   1 mg, Oral, Nightly         Stop These Medications    buprenorphine-naloxone 2-0.5 MG per SL tablet  Commonly known as: SUBOXONE            Discharge Diet: Regular    Activity at Discharge: As tolerated    Follow-up Appointments:    Future Appointments   Date Time Provider Department Center   2/15/2023 11:30 AM [facility]PAT[facility]   2/15/2023 12:00 PM Nestor Milton MD [facility]     Additional Instructions for the Follow-ups that You Need to Schedule     Ambulatory Referral to Specialty Pharmacy   As directed      Service Requested: Hepatitis C    Follow-up needed: Yes                 Time: I spent  25 minutes on this discharge activity which included: face-to-face encounter with the patient, reviewing the data in the system, coordination of the care with the nursing staff as well as consultants, documentation, and entering orders.      Javier Sears MD  02/08/23  08:46 EST

## 2023-02-10 NOTE — PAYOR COMM NOTE
"Sabra Pedraza (32 y.o. Male)     Date of Birth   1990    Social Security Number       Address   [address] [place] [address]    Home Phone   [phone]    MRN   [id number]       [employer]   None    Marital Status   Single                            Admission Date   2/3/23    Admission Type   Emergency    Admitting Provider   Erika Renee MD    Attending Provider       Department, Room/Bed   [facility] ADULT PSY[facility], 1019/1S       Discharge Date   2/8/2023    Discharge Disposition   Home or Self Care    Discharge Destination                               Attending Provider: (none)   Allergies: No Known Allergies    Isolation: None   Infection: None   Code Status: Prior    Ht: 175.3 cm (69\")   Wt: 65.8 kg (145 lb 1.6 oz)    Admission Cmt: None   Principal Problem: Psychosis (HCC) [F29]                 Active Insurance as of 2/3/2023     Primary Coverage     Payor Plan Insurance Group Employer/Plan Group    [place] [id number]     Payor Plan Address Payor Plan Phone Number Payor Plan Fax Number Effective Dates    [employer] [address] [address] [phone]  1/1/2020 - None Entered    [facility] [id number]       Subscriber Name Subscriber Birth Date Member ID       SABRA PEDRAZA 1990 [id number]                 Emergency Contacts      (Rel.) Home Phone Work Phone Mobile Phone    Gloria Montes (Mother) [phone] -- [phone]        Please attach this discharge information to authorization # [id number]    Discharge date: 2/8/2023    Discharge diagnosis: Psychosis (F29)  Methamphetamine Use Disorder, Severe F15.20)  Rule out Bipolar I Disorder      Follow up:  Romeo Gallego   [phone]     Admit at discharge     Discharge Summary:     Javier Sears MD   Physician  Psychiatry  Discharge Summary     Signed  Date of Service:  02/08/23 0846  Creation Time:  02/08/23 0846     Signed                                    "                                                              Date of Discharge:  2/8/2023     Discharge Diagnosis:  Psychosis  Methamphetamine Use Disorder, Severe  Rule out Bipolar I Disorder         Hospital Course:  Patient was admitted for safety and stabilization.   Routine labs were checked.  Patient was assigned a master's level therapist and provided with an opportunity to participate in group and individual therapy and counseling on the unit.  On the first night of admission patient developed some significant agitation and received IM Haldol, Ativan and Thorazine.  Nursing staff felt patient was overly sedated and the hospitalist was consulted.  See consult note for details.  Patient ultimately improved over the following 24 hours.  He did not show symptoms of neuroleptic malignant syndrome.  Patient's mental status improved over the initial 36 hours of the hospitalization and by day hospital day 2 he was fully alert and oriented and interacting with the nursing staff and the psychiatrist during the evaluations.  Patient was started on Risperdal 1 mg nightly.  It was felt that patient's presenting symptoms were due largely to substance intoxication and that he may not need to continue on the Risperdal long-term.  He was noted to have a prolonged QTC on admission at 504 but that improved on subsequent EKGs and he had a QTC of 433 on February 5th.  A test for hepatitis C came back positive.  Patient was counseled regarding this and advised to follow-up with primary care physician and eventually GI as an outpatient.  Patient voiced an understanding and agreed to do so.  Arrangements were made for patient to be discharged to rehab and he was discharged in stable condition.           Consults:          Consults      Date and Time Order Name Status Description     2/4/2023  3:37 AM Inpatient Hospitalist Consult                 Labs:          Lab Results (all)      Procedure Component Value Units Date/Time     CK  [[id number]]  (Abnormal) Collected: 02/06/23 0421     Specimen: Blood Updated: 02/06/23 0506       Creatine Kinase 538 U/L       Comprehensive Metabolic Panel [[id number]]  (Abnormal) Collected: 02/05/23 0547     Specimen: Blood Updated: 02/05/23 0637       Glucose 96 mg/dL         BUN 27 mg/dL         Creatinine 1.12 mg/dL         Sodium 139 mmol/L         Potassium 4.6 mmol/L         Chloride 104 mmol/L         CO2 25.7 mmol/L         Calcium 9.2 mg/dL         Total Protein 6.8 g/dL         Albumin 3.8 g/dL         ALT (SGPT) 23 U/L         AST (SGOT) 27 U/L         Alkaline Phosphatase 76 U/L         Total Bilirubin 0.4 mg/dL         Globulin 3.0 gm/dL         A/G Ratio 1.3 g/dL         BUN/Creatinine Ratio 24.1       Anion Gap 9.3 mmol/L         eGFR 89.5 mL/min/1.73       Narrative:       GFR Normal >60  Chronic Kidney Disease <60  Kidney Failure <15        CK [[id number]]  (Abnormal) Collected: 02/05/23 0547     Specimen: Blood Updated: 02/05/23 0637       Creatine Kinase 831 U/L       CBC (No Diff) [[id number]]  (Normal) Collected: 02/05/23 0547     Specimen: Blood Updated: 02/05/23 0617       WBC 7.06 10*3/mm3         RBC 4.66 10*6/mm3         Hemoglobin 13.9 g/dL         Hematocrit 42.1 %         MCV 90.3 fL         MCH 29.8 pg         MCHC 33.0 g/dL         RDW 13.5 %         RDW-SD 44.1 fl         MPV 9.3 fL         Platelets 327 10*3/mm3       CK [[id number]]  (Abnormal) Collected: 02/04/23 1938     Specimen: Blood Updated: 02/04/23 2011       Creatine Kinase 1,041 U/L       Basic Metabolic Panel [[id number]]  (Abnormal) Collected: 02/04/23 1938     Specimen: Blood Updated: 02/04/23 2011       Glucose 93 mg/dL         BUN 20 mg/dL         Creatinine 1.24 mg/dL         Sodium 146 mmol/L         Potassium 3.7 mmol/L         Chloride 109 mmol/L         CO2 24.8 mmol/L         Calcium 9.2 mg/dL         BUN/Creatinine Ratio 16.1       Anion Gap 12.2 mmol/L         eGFR 79.2 mL/min/1.73       Narrative:       GFR  "Normal >60  Chronic Kidney Disease <60  Kidney Failure <15        CK [[id number]]  (Abnormal) Collected: 02/04/23 0411     Specimen: Blood Updated: 02/04/23 1435       Creatine Kinase 1,592 U/L       CK [[id number]]  (Abnormal) Collected: 02/04/23 0330     Specimen: Blood Updated: 02/04/23 0755       Creatine Kinase 1,620 U/L       Hepatitis C RNA, Quantitative, PCR (graph) [[id number]] Collected: 02/04/23 0717     Specimen: Blood Updated: 02/04/23 0738     Hepatitis Panel, Acute [[id number]]  (Abnormal) Collected: 02/04/23 0411     Specimen: Blood Updated: 02/04/23 0556       Hepatitis B Surface Ag Non-Reactive       Hep A IgM Non-Reactive       Hep B C IgM Non-Reactive       Hepatitis C Ab Reactive     Narrative:       Results may be falsely decreased if patient taking Biotin.      Procalcitonin [[id number]]  (Normal) Collected: 02/03/23 1822     Specimen: Blood from Arm, Right Updated: 02/04/23 0511       Procalcitonin 0.16 ng/mL       Narrative:       As a Marker for Sepsis (Non-Neonates):     1. <0.5 ng/mL represents a low risk of severe sepsis and/or septic shock.  2. >2 ng/mL represents a high risk of severe sepsis and/or septic shock.     As a Marker for Lower Respiratory Tract Infections that require antibiotic therapy:     PCT on Admission    Antibiotic Therapy       6-12 Hrs later     >0.5                Strongly Recommended  >0.25 - <0.5        Recommended   0.1 - 0.25          Discouraged              Remeasure/reassess PCT  <0.1                Strongly Discouraged     Remeasure/reassess PCT     As 28 day mortality risk marker: \"Change in Procalcitonin Result\" (>80% or <=80%) if Day 0 (or Day 1) and Day 4 values are available. Refer to http://www.[employer]-pct-calculator.com     Change in PCT <=80%  A decrease of PCT levels below or equal to 80% defines a positive change in PCT test result representing a higher risk for 28-day all-cause mortality of patients diagnosed with severe sepsis for septic " shock.     Change in PCT >80%  A decrease of PCT levels of more than 80% defines a negative change in PCT result representing a lower risk for 28-day all-cause mortality of patients diagnosed with severe sepsis or septic shock.         Comprehensive Metabolic Panel [[id number]]  (Abnormal) Collected: 02/04/23 0330     Specimen: Blood Updated: 02/04/23 0405       Glucose 96 mg/dL         BUN 22 mg/dL         Creatinine 1.33 mg/dL         Sodium 139 mmol/L         Potassium 3.6 mmol/L         Comment: Slight hemolysis detected by analyzer. Results may be affected.          Chloride 106 mmol/L         CO2 20.6 mmol/L         Calcium 9.2 mg/dL         Total Protein 6.8 g/dL         Albumin 3.8 g/dL         ALT (SGPT) 29 U/L         AST (SGOT) 49 U/L         Alkaline Phosphatase 75 U/L         Total Bilirubin 0.5 mg/dL         Globulin 3.0 gm/dL         A/G Ratio 1.3 g/dL         BUN/Creatinine Ratio 16.5       Anion Gap 12.4 mmol/L         eGFR 72.8 mL/min/1.73       Narrative:       GFR Normal >60  Chronic Kidney Disease <60  Kidney Failure <15        Lactic Acid, Plasma [[id number]]  (Normal) Collected: 02/04/23 0330     Specimen: Blood Updated: 02/04/23 0401       Lactate 0.8 mmol/L       CBC & Differential [[id number]]  (Abnormal) Collected: 02/04/23 0330     Specimen: Blood Updated: 02/04/23 0344     Narrative:       The following orders were created for panel order CBC & Differential.  Procedure                               Abnormality         Status                     ---------                               -----------         ------                     CBC Auto Differential[[id number]]        Abnormal            Final result                  Please view results for these tests on the individual orders.     CBC Auto Differential [[id number]]  (Abnormal) Collected: 02/04/23 0330     Specimen: Blood Updated: 02/04/23 0344       WBC 8.75 10*3/mm3         RBC 4.35 10*6/mm3         Hemoglobin 13.0 g/dL         Hematocrit  40.2 %         MCV 92.4 fL         MCH 29.9 pg         MCHC 32.3 g/dL         RDW 13.3 %         RDW-SD 45.3 fl         MPV 9.0 fL         Platelets 292 10*3/mm3         Neutrophil % 61.2 %         Lymphocyte % 25.3 %         Monocyte % 12.1 %         Eosinophil % 0.7 %         Basophil % 0.5 %         Immature Grans % 0.2 %         Neutrophils, Absolute 5.36 10*3/mm3         Lymphocytes, Absolute 2.21 10*3/mm3         Monocytes, Absolute 1.06 10*3/mm3         Eosinophils, Absolute 0.06 10*3/mm3         Basophils, Absolute 0.04 10*3/mm3         Immature Grans, Absolute 0.02 10*3/mm3         nRBC 0.0 /100 WBC       C-reactive Protein [[id number]]  (Normal) Collected: 02/03/23 1822     Specimen: Blood from Arm, Right Updated: 02/04/23 0326       C-Reactive Protein 0.47 mg/dL               Imaging:      Imaging Results (All)      None                Condition on Discharge:  Stable     Vital Signs  Temp:  [97.5 °F (36.4 °C)-99.2 °F (37.3 °C)] 97.5 °F (36.4 °C)  Heart Rate:  [68-79] 79  Resp:  [18] 18  BP: (111-115)/(75-82) 111/75     Discharge Disposition  Home or Self Care     Discharge Medications           Discharge Medications            New Medications      Instructions Start Date   risperiDONE 1 MG tablet  Commonly known as: risperDAL    1 mg, Oral, Nightly            Stop These Medications    buprenorphine-naloxone 2-0.5 MG per SL tablet  Commonly known as: SUBOXONE                Discharge Diet: Regular     Activity at Discharge: As tolerated     Follow-up Appointments:            Future Appointments   Date Time Provider Department Center   2/15/2023 11:30 AM [facility]IT[facility]   2/15/2023 12:00 PM Nestor Milton MD [facility]           Additional Instructions for the Follow-ups that You Need to Schedule      Ambulatory Referral to Specialty Pharmacy   As directed        Service Requested: Hepatitis C    Follow-up needed: Yes                      Time: I spent  25 minutes on this  discharge activity which included: face-to-face encounter with the patient, reviewing the data in the system, coordination of the care with the nursing staff as well as consultants, documentation, and entering orders.       Javier Sears MD  02/08/23  08:46 EST

## 2023-02-15 ENCOUNTER — TELEPHONE (OUTPATIENT)
Dept: FAMILY MEDICINE CLINIC | Facility: CLINIC | Age: 33
End: 2023-02-15

## 2023-02-15 NOTE — TELEPHONE ENCOUNTER
He needs to be at his hep C appointment in [place] at 11:30  It does not look like he is going to make his 12:00 appointment with me!    I would recommend he keep the 11:30 appointment for Hep C as I cannot treat this and we will likely need to cancel his 12:00 here!    Please contact pt and confirm what he is going to do as he cannot be in 2 places at the same time!

## 2024-02-09 ENCOUNTER — TELEPHONE (OUTPATIENT)
Dept: FAMILY MEDICINE CLINIC | Facility: CLINIC | Age: 34
End: 2024-02-09
Payer: MEDICAID